# Patient Record
Sex: MALE | Race: WHITE | NOT HISPANIC OR LATINO | URBAN - METROPOLITAN AREA
[De-identification: names, ages, dates, MRNs, and addresses within clinical notes are randomized per-mention and may not be internally consistent; named-entity substitution may affect disease eponyms.]

---

## 2018-06-11 ENCOUNTER — TELEPHONE (OUTPATIENT)
Dept: CARDIOLOGY | Facility: CLINIC | Age: 73
End: 2018-06-11

## 2018-06-11 NOTE — TELEPHONE ENCOUNTER
Gastroenterologist asking for cardiac risk assessment and whether Xarelto can be stopped for colonoscopy scheduled for 6/19/2018.  Last seen in early January and in sinus rhythm on flecainide and Xarelto.  Xarelto may be held for 2 days prior to this procedure recognizing small risk of stroke.  He is at low cardiac risk in setting of colonoscopy.  Will confirm with Dr. Thapa.

## 2018-06-13 ENCOUNTER — TELEPHONE (OUTPATIENT)
Dept: SCHEDULING | Facility: CLINIC | Age: 73
End: 2018-06-13

## 2018-06-13 NOTE — TELEPHONE ENCOUNTER
[facility] note of 1/2/18 and ekg  faxed to [phone]

## 2018-06-15 ENCOUNTER — TELEPHONE (OUTPATIENT)
Dept: SCHEDULING | Facility: CLINIC | Age: 73
End: 2018-06-15

## 2018-06-15 NOTE — TELEPHONE ENCOUNTER
DANA the patient saw his primary physician earlier in the week and his physician is concerned that he has some cardiac issues going on.  The patient would not give details.  He was wondering if he should cancel his colonoscopy that is scheduled for Tuesday.  The colonoscopy is a routine exam and the patient is very anxious about his cardiac status.  He has an appoint with Dr. Thapa on Wednesday.  I offered to have him wait until Monday to discuss with JAZMYN Rey and Dr. Thapa but he reports that he has to stop start his prep tomorrow.  After a lengthy discussion he has decided he will postpone the colonoscopy and see Dr. Lamb on Wednesday.

## 2018-06-15 NOTE — TELEPHONE ENCOUNTER
Pt would like a call from Keely Jules to discuss 6/19 scheduled colonoscopy. Pt have a 6/20 appt scheduled with Dr. Thapa.

## 2018-06-20 ENCOUNTER — OFFICE VISIT (OUTPATIENT)
Dept: CARDIOLOGY | Facility: CLINIC | Age: 73
End: 2018-06-20
Payer: COMMERCIAL

## 2018-06-20 VITALS
DIASTOLIC BLOOD PRESSURE: 82 MMHG | WEIGHT: 221 LBS | HEIGHT: 74 IN | BODY MASS INDEX: 28.36 KG/M2 | RESPIRATION RATE: 16 BRPM | SYSTOLIC BLOOD PRESSURE: 124 MMHG | HEART RATE: 56 BPM

## 2018-06-20 DIAGNOSIS — R00.2 PALPITATIONS: ICD-10-CM

## 2018-06-20 DIAGNOSIS — E78.00 PURE HYPERCHOLESTEROLEMIA: ICD-10-CM

## 2018-06-20 DIAGNOSIS — I48.0 PAROXYSMAL ATRIAL FIBRILLATION (CMS/HCC): Primary | ICD-10-CM

## 2018-06-20 PROCEDURE — 99213 OFFICE O/P EST LOW 20 MIN: CPT | Performed by: INTERNAL MEDICINE

## 2018-06-20 PROCEDURE — 93000 ELECTROCARDIOGRAM COMPLETE: CPT | Performed by: INTERNAL MEDICINE

## 2018-06-20 RX ORDER — MULTIVITAMIN
1 TABLET ORAL DAILY
COMMUNITY
Start: 2011-07-20

## 2018-06-20 RX ORDER — ARMODAFINIL 150 MG/1
150 TABLET ORAL AS NEEDED
COMMUNITY
End: 2020-02-04 | Stop reason: ALTCHOICE

## 2018-06-20 RX ORDER — AZELASTINE 1 MG/ML
SPRAY, METERED NASAL AS NEEDED
COMMUNITY
Start: 2011-07-20 | End: 2020-02-04 | Stop reason: ALTCHOICE

## 2018-06-20 RX ORDER — FLECAINIDE ACETATE 50 MG/1
50 TABLET ORAL DAILY
COMMUNITY
Start: 2018-01-02 | End: 2018-06-21 | Stop reason: SDUPTHER

## 2018-06-20 RX ORDER — SIMVASTATIN 40 MG/1
40 TABLET, FILM COATED ORAL DAILY
COMMUNITY
Start: 2018-01-02 | End: 2018-06-21 | Stop reason: SDUPTHER

## 2018-06-20 RX ORDER — FLECAINIDE ACETATE 100 MG/1
100 TABLET ORAL DAILY
COMMUNITY
Start: 2018-01-02 | End: 2018-06-21 | Stop reason: SDUPTHER

## 2018-06-20 RX ORDER — FLUTICASONE PROPIONATE 50 UG/1
POWDER, METERED RESPIRATORY (INHALATION) AS NEEDED
COMMUNITY
End: 2020-02-04 | Stop reason: ALTCHOICE

## 2018-06-20 NOTE — PROGRESS NOTES
June 22, 2018      Patient: Roger Willig   YOB: 1945   Date of Visit 6/20/2018   [facility]# [id number]   MRN# [id number]       June 20, 2018    CROW ZELAYA  [address]  [place] [address]  [place] [address]    Dear CROW,    Roger Willig was in our cardiology outpatient offices today, a bit ahead of schedule.  He was concerned because he has been experiencing shortness of breath when he pushes a heavy object and is bent over.  He has been measuring his blood pressure and oxygen saturation at home and thought that there had been some changes.  He talked to you about this and you recommended that he see us in consultation and that is why he was here today.  I know that he had recent laboratory work that mostly looked fine, including his blood lipids.  As you will recall, he had an exercise echocardiogram about a year ago that did not reveal evidence of myocardial ischemia or important structural heart disease.  He has not had any sustained episodes of palpitations on   flecainide.  His other medicines are eyedrops, rivaroxaban, Zocor, and Nuvigil. Simvastatin seems to be doing an effective job of controlling his LDL cholesterol.  He is also due to have a colonoscopy, but held off on scheduling that procedure until our evaluation today.  On careful questioning, he did tell me that he occasionally has palpitations, especially when he lies in bed at night and rarely during activity.  He was quite concerned about that finding as well.    On examination today, blood pressure was 124/82, with a heart rate of 56 beats per minute.  His rhythm was regular, without ectopic beats.  Respiratory rate was 16.  He weighed about 221 pounds.  His lung fields were clear.  He did not have jugular venous distention or carotid bruits.  The cardiac examination revealed a regular rhythm with a fourth sound and no murmurs.  His abdomen was benign with no organomegaly or bruits.  He had no edema.  His electrocardiogram showed a  sinus rhythm with a borderline prolonged CT interval and a mild interventricular conduction delay due to flecainide.    I ordered a 48-hour ambulatory ECG for Mr. Willig to be doubly certain that he is not having sustained episodes of atrial fibrillation.  I do not think we need any other tests for the time-being, especially given the fact that he has had a recent stress echocardiogram.  We will review the results of Mr. Willig's Holter when they are available and if they are acceptable, I see no reason why he cannot go ahead with a colonoscopy, with the usual instructions about anticoagulation.  I also encouraged him to maintain his usual contact with you.    If you have any questions about Mr. Willig's cardiac situation at any time, I would be delighted to address them.  Mr. Willig had a number of questions today that we answered to his satisfaction and he left the office in good spirits.  Thank you for your consideration.    Sincerely,      YOMI AVILEZ MD    DD: 06/20/2018 12:02  DT: 06/20/2018 12:34  Voice ID: [id number]/Report ID: [id number]  [email or web address]

## 2018-06-21 ENCOUNTER — TELEPHONE (OUTPATIENT)
Dept: SCHEDULING | Facility: CLINIC | Age: 73
End: 2018-06-21

## 2018-06-21 RX ORDER — FLECAINIDE ACETATE 50 MG/1
50 TABLET ORAL DAILY
Qty: 30 TABLET | Refills: 6 | Status: SHIPPED | OUTPATIENT
Start: 2018-06-21 | End: 2018-12-20

## 2018-06-21 RX ORDER — SIMVASTATIN 40 MG/1
40 TABLET, FILM COATED ORAL DAILY
Qty: 30 TABLET | Refills: 6 | Status: SHIPPED | OUTPATIENT
Start: 2018-06-21 | End: 2018-12-20

## 2018-06-21 RX ORDER — FLECAINIDE ACETATE 100 MG/1
TABLET ORAL
Qty: 30 TABLET | Refills: 6 | Status: SHIPPED | OUTPATIENT
Start: 2018-06-21 | End: 2018-12-20

## 2018-06-21 NOTE — TELEPHONE ENCOUNTER
Dr Thapa pt call for wily re heart monitor being put on friday and Medication question. Please call pt cell to discuss

## 2018-06-21 NOTE — TELEPHONE ENCOUNTER
Reviewed process for Holter monitor and need to call our office 1 week after return to review results.  We will also refill all prescriptions at his request.

## 2018-06-29 ENCOUNTER — TELEPHONE (OUTPATIENT)
Dept: CARDIOLOGY | Facility: CLINIC | Age: 73
End: 2018-06-29

## 2018-12-20 ENCOUNTER — TELEPHONE (OUTPATIENT)
Dept: SCHEDULING | Facility: CLINIC | Age: 73
End: 2018-12-20

## 2018-12-20 RX ORDER — SIMVASTATIN 40 MG/1
40 TABLET, FILM COATED ORAL DAILY
Qty: 30 TABLET | Refills: 0 | COMMUNITY
Start: 2018-12-20 | End: 2019-02-05 | Stop reason: SDUPTHER

## 2018-12-20 RX ORDER — FLECAINIDE ACETATE 100 MG/1
TABLET ORAL
Qty: 30 TABLET | Refills: 0 | COMMUNITY
Start: 2018-12-20 | End: 2019-02-05 | Stop reason: SDUPTHER

## 2018-12-20 RX ORDER — FLECAINIDE ACETATE 50 MG/1
50 TABLET ORAL DAILY
Qty: 30 TABLET | Refills: 0 | COMMUNITY
Start: 2018-12-20 | End: 2019-02-05 | Stop reason: SDUPTHER

## 2018-12-20 NOTE — TELEPHONE ENCOUNTER
Email sent to Dr. Thapa.    See no contraindication to these medications but patient asked me to confirm directly with Dr. Thapa.

## 2018-12-20 NOTE — TELEPHONE ENCOUNTER
"Pt went to Urgent care for an \"issue\" and was prescribed Sulfamethoxazole Trime 800mg.  Pt would like to speak to Keely Jules and find out if this has any interaction with his Flecanide. Please call pt back @  [phone]    "

## 2018-12-20 NOTE — TELEPHONE ENCOUNTER
Confirmed with Dr. Thapa that no contraindication to this medication L on flecainide.  I left a message with the patient as well.

## 2019-02-05 ENCOUNTER — OFFICE VISIT (OUTPATIENT)
Dept: CARDIOLOGY | Facility: CLINIC | Age: 74
End: 2019-02-05
Payer: COMMERCIAL

## 2019-02-05 VITALS
HEART RATE: 56 BPM | BODY MASS INDEX: 29 KG/M2 | WEIGHT: 226 LBS | DIASTOLIC BLOOD PRESSURE: 70 MMHG | SYSTOLIC BLOOD PRESSURE: 116 MMHG | RESPIRATION RATE: 16 BRPM | HEIGHT: 74 IN | OXYGEN SATURATION: 98 %

## 2019-02-05 DIAGNOSIS — E78.00 PURE HYPERCHOLESTEROLEMIA: ICD-10-CM

## 2019-02-05 DIAGNOSIS — R00.2 PALPITATIONS: ICD-10-CM

## 2019-02-05 DIAGNOSIS — E78.2 MIXED HYPERLIPIDEMIA: ICD-10-CM

## 2019-02-05 DIAGNOSIS — I48.91 ATRIAL FIBRILLATION, UNSPECIFIED TYPE (CMS/HCC): Primary | ICD-10-CM

## 2019-02-05 PROCEDURE — 93000 ELECTROCARDIOGRAM COMPLETE: CPT | Performed by: INTERNAL MEDICINE

## 2019-02-05 PROCEDURE — 99213 OFFICE O/P EST LOW 20 MIN: CPT | Performed by: INTERNAL MEDICINE

## 2019-02-05 RX ORDER — SIMVASTATIN 40 MG/1
40 TABLET, FILM COATED ORAL DAILY
Qty: 30 TABLET | Refills: 0 | Status: SHIPPED | OUTPATIENT
Start: 2019-02-05 | End: 2019-03-20 | Stop reason: SDUPTHER

## 2019-02-05 RX ORDER — FLECAINIDE ACETATE 50 MG/1
50 TABLET ORAL DAILY
Qty: 30 TABLET | Refills: 0 | Status: SHIPPED | OUTPATIENT
Start: 2019-02-05 | End: 2019-07-29 | Stop reason: SDUPTHER

## 2019-02-05 RX ORDER — FLECAINIDE ACETATE 100 MG/1
TABLET ORAL
Qty: 30 TABLET | Refills: 0 | Status: SHIPPED | OUTPATIENT
Start: 2019-02-05 | End: 2019-03-29 | Stop reason: SDUPTHER

## 2019-02-05 ASSESSMENT — PAIN SCALES - GENERAL: PAINLEVEL: 0-NO PAIN

## 2019-02-05 NOTE — PROGRESS NOTES
February 5, 2019      Patient: Roger Willig   YOB: 1945   Date of Visit 2/5/2019   [facility]# [id number]   MRN# [id number]       February 05, 2019    CROW ZELAYA  [address]  [place] [address]  [place] [address]    Dear CROW,    Roger Willig was in our cardiology outpatient offices today for a follow-up appointment.  He has done well over the last several months.  He occasionally has palpitations, but they are short lasting and have not interfered with his ability to function.  We ordered a 48-hour ambulatory ECG monitor at his last visit.  I am happy to report that he did not have any sustained episodes of atrial fibrillation during that recording.  Heart rates were acceptable, and there were no other important findings.  We are up to date with regard to noninvasive cardiac studies, his last set having been completed in 2017.  He told me that he does see you on a regular basis and that you have been satisfied with his general medical assessments.  He has a right hand injury   that has required some attention.  He was concerned that anticoagulation might be exacerbating the problem. In any case, he may need referral, and I will leave that to your judgment.  His medicines include some EYE DROPS, FLECAINIDE, FLOVENT, RIVAROXABAN, and SIMVASTATIN.    On examination, blood pressure was 116/70 with a heart rate of 56 beats per minute.  His rhythm was regular without ectopic beats.  Respiratory rate was 16 and he weighed 226 pounds, the same as last time.  He looked to be in reasonable physical condition overall.  His lung fields were clear.  He did not have jugular venous distention or carotid bruits.  The cardiac examination was remarkable only for a fourth sound without any other extra sounds or murmurs or premature beats.  His abdomen was benign without organomegaly or bruits, and he did not have any edema of the lower extremities.  Electrocardiogram showed sinus rhythm with an APD and no other  findings.  Electrocardiographic intervals were acceptable.    Mr. Willig is stable, and so I did not change his medicines or order any tests. He will be back in 6 months for a surveillance visit, and we will be available to see him sooner and to address issues in the interim.  I am delighted that Mr. Willig has had a good response to a conservative program.  Thank you for sharing his care.    Best regards,        YOMI AVILEZ MD        DD: 02/05/2019 10:20  DT: 02/05/2019 11:47  Voice ID: [id number]/Report ID: [id number]  [email or web address]

## 2019-03-20 RX ORDER — SIMVASTATIN 40 MG/1
40 TABLET, FILM COATED ORAL DAILY
Qty: 30 TABLET | Refills: 6 | Status: SHIPPED | OUTPATIENT
Start: 2019-03-20 | End: 2019-08-06 | Stop reason: SDUPTHER

## 2019-03-29 RX ORDER — FLECAINIDE ACETATE 100 MG/1
TABLET ORAL
Qty: 90 TABLET | Refills: 1 | Status: SHIPPED | OUTPATIENT
Start: 2019-03-29 | End: 2019-07-26 | Stop reason: SDUPTHER

## 2019-07-26 RX ORDER — FLECAINIDE ACETATE 100 MG/1
TABLET ORAL
Qty: 90 TABLET | Refills: 1 | Status: SHIPPED | OUTPATIENT
Start: 2019-07-26 | End: 2019-07-29 | Stop reason: SDUPTHER

## 2019-07-26 NOTE — TELEPHONE ENCOUNTER
Dr. Thapa- pharmacy calling to verify dosing on flecainide. Per you OV from 2/5/19 flecainide is listed as 100 mg daily in am and 50 mg oral daily. I did escribe to pharm for flecainide 100 mg daily.  Please clarify.

## 2019-07-29 ENCOUNTER — TELEPHONE (OUTPATIENT)
Dept: SCHEDULING | Facility: CLINIC | Age: 74
End: 2019-07-29

## 2019-07-29 RX ORDER — FLECAINIDE ACETATE 100 MG/1
TABLET ORAL
Qty: 30 TABLET | Refills: 1 | Status: SHIPPED | OUTPATIENT
Start: 2019-07-29 | End: 2019-08-06 | Stop reason: SDUPTHER

## 2019-07-29 RX ORDER — FLECAINIDE ACETATE 50 MG/1
50 TABLET ORAL DAILY
Qty: 90 TABLET | Refills: 1 | Status: SHIPPED | OUTPATIENT
Start: 2019-07-29 | End: 2019-07-29 | Stop reason: SDUPTHER

## 2019-07-29 RX ORDER — FLECAINIDE ACETATE 100 MG/1
TABLET ORAL
Qty: 90 TABLET | Refills: 1 | Status: SHIPPED | OUTPATIENT
Start: 2019-07-29 | End: 2019-07-29 | Stop reason: SDUPTHER

## 2019-07-29 RX ORDER — FLECAINIDE ACETATE 50 MG/1
TABLET ORAL
Qty: 30 TABLET | Refills: 1 | Status: SHIPPED | OUTPATIENT
Start: 2019-07-29 | End: 2019-08-06

## 2019-07-29 NOTE — TELEPHONE ENCOUNTER
Please call the patient and find out how he is currently taking the medication. Once a day is generally not adequate. He needs to take a second dose and that can be either 100 or 50 mg.

## 2019-07-29 NOTE — TELEPHONE ENCOUNTER
Patient returning call from Ame Griffin RN regarding his Flecainide dosing. He is currently taking 50mg daily in the morning and 100 mg daily in the evening. He has a sufficient supply of this medication. He has two different strength tablets at home, both a 50mg and 100mg.  I have updated his medication list.      Just an KERRYI

## 2019-07-29 NOTE — TELEPHONE ENCOUNTER
Elier pt - called pt back LMOM need to know exact dosing of flecanide he is taking.  Needs to be taken twice daily 100mg in AM and at least 50mg or 100mg in PM

## 2019-07-29 NOTE — TELEPHONE ENCOUNTER
Pt returned call to practice regarding Flecainide refill request. Please call back asap at [phone].

## 2019-08-06 ENCOUNTER — OFFICE VISIT (OUTPATIENT)
Dept: CARDIOLOGY | Facility: CLINIC | Age: 74
End: 2019-08-06
Payer: COMMERCIAL

## 2019-08-06 VITALS
BODY MASS INDEX: 28.62 KG/M2 | WEIGHT: 223 LBS | HEART RATE: 78 BPM | SYSTOLIC BLOOD PRESSURE: 124 MMHG | DIASTOLIC BLOOD PRESSURE: 82 MMHG | HEIGHT: 74 IN | OXYGEN SATURATION: 97 %

## 2019-08-06 DIAGNOSIS — I48.0 PAROXYSMAL ATRIAL FIBRILLATION (CMS/HCC): ICD-10-CM

## 2019-08-06 DIAGNOSIS — E78.00 PURE HYPERCHOLESTEROLEMIA: ICD-10-CM

## 2019-08-06 DIAGNOSIS — R00.2 PALPITATIONS: ICD-10-CM

## 2019-08-06 DIAGNOSIS — I48.0 PAF (PAROXYSMAL ATRIAL FIBRILLATION) (CMS/HCC): Primary | ICD-10-CM

## 2019-08-06 PROCEDURE — 99213 OFFICE O/P EST LOW 20 MIN: CPT | Performed by: INTERNAL MEDICINE

## 2019-08-06 PROCEDURE — 93000 ELECTROCARDIOGRAM COMPLETE: CPT | Performed by: INTERNAL MEDICINE

## 2019-08-06 RX ORDER — FLECAINIDE ACETATE 100 MG/1
TABLET ORAL
Qty: 30 TABLET | Refills: 6 | Status: SHIPPED | OUTPATIENT
Start: 2019-08-06 | End: 2020-02-04 | Stop reason: SDUPTHER

## 2019-08-06 RX ORDER — SIMVASTATIN 40 MG/1
40 TABLET, FILM COATED ORAL DAILY
Qty: 30 TABLET | Refills: 6 | Status: SHIPPED | OUTPATIENT
Start: 2019-08-06 | End: 2020-02-04 | Stop reason: ALTCHOICE

## 2019-08-06 NOTE — PROGRESS NOTES
August 6, 2019      Patient: Roger Willig   YOB: 1945   Date of Visit 8/6/2019   [facility]# [id number]   MRN# [id number]       August 06, 2019    Dr. CROW ZELAYA  [address]  [place] [address]  [place] [address]    Dear CROW,    Roger Willig was in our cardiology outpatient offices today for a routine appointment.  Generally, he has done well.  As you know, he reduced his dose of flecainide to 100 mg in the morning and 50 mg in the evening and this may have been associated with an increased number of atrial fibrillation episodes.  We discussed today the idea of going back to 100 mg twice per day.  This may have caused a bit of dizziness in the past and will see if he has similar adverse effects.  Otherwise, he is tolerating his medical program well and it consists of simvastatin, rivaroxaban, flecainide, an occasional inhaler and eyedrops.  We are up to date currently with noninvasive cardiac studies. Mr. Willig has had no other cardiovascular issues and was in good spirits.  He told me that he does see you on a regular basis for routine laboratory testing and that you have been satisfied with those results including his lipid analyses.  He had a number of questions today about exercise and weight control and I believe he understands the value of both.    On examination today, blood pressure was 124/82 with a heart rate of 78 beats per minute.  His rhythm was regular with an occasional ectopic beat. Respiratory rate was 16 and he weighed 223 pounds, about 3 pounds less than last time.  His lung fields were clear.  He did not have jugular venous distention and I did not hear any carotid bruits.  He had his usual fourth sound on cardiac auscultation without any other extra sounds or murmurs.  His abdomen was benign with no organomegaly or bruits.  He did not have any edema.  Electrocardiogram showed sinus rhythm with a prolonged OK interval of 200 milliseconds and occasionally APDs.  His QRS duration  was not unduly extended on flecainide.    Mr. Willig then is stable from our perspective and so I did not alter his medical program.  I did provide him with refills for his cardiac prescriptions. He will return in 6 months.  After that visit, we will schedule an exercise echocardiogram for surveillance.  If in the meantime, should you require our assistance or have any other questions about Mr. Willig's cardiovascular situation, please let me know. Thank you for your consideration.    Sincerely,        YOMI AVILEZ MD    DD: 08/06/2019 09:02  DT: 08/06/2019 09:41  Voice ID: [id number]/Report ID: [id number]  [email or web address]

## 2019-10-17 ENCOUNTER — TELEPHONE (OUTPATIENT)
Dept: SCHEDULING | Facility: CLINIC | Age: 74
End: 2019-10-17

## 2019-10-17 NOTE — TELEPHONE ENCOUNTER
DR AVILEZ pt--reports he is having some hamstring pain and wishes to know of he should change SIMVASTATIN 40 MG to C Q10 after talking to a friend and a pharmacist.     He states he is exercising, adding exercises and this started three weeks ago after a 5 mile walk.  He has tried other therapies to help the pain with no relief.    HE reports he has been taking SIMVASTATIN for years.    Per protocol advised PT to stop the SIMVASTATIN for two weeks, and then call our office with report on pain changes.    He is going to [place] for a trip, he states he will call in two weeks with update.    Please advise of any changes.    PT can be reace if needed at [phone].

## 2019-11-11 ENCOUNTER — TELEPHONE (OUTPATIENT)
Dept: SCHEDULING | Facility: CLINIC | Age: 74
End: 2019-11-11

## 2019-11-11 NOTE — TELEPHONE ENCOUNTER
Spoke to patient.  He had lab work done May 2019 through his PCP.  He has a follow-up appointment with his PCP in December 2019.  I advised him to discuss with his PCP unless you would like to change his statin-  Dr. Thapa.

## 2019-11-11 NOTE — TELEPHONE ENCOUNTER
I called patient, not available.,  Left message to call back.    I do not see any labs/lipid profile in [facility] system.    Which Drug would you like to recommend Dr. Thapa?  Patient should follow-up with his PCP

## 2019-11-11 NOTE — TELEPHONE ENCOUNTER
Pt of Dr. Thapa- Pt calling with update of statin hold. Pt currently holding simvastatin x2 weeks due to knee pain. Pt calling to report he felt no different after 5 days and went back on simvastatin. However he noticed that while he was holding his shoulder, low back, and right knee pain improved.     Pt would like to try a different lipid lowering medication.     Asking for new script to be sent for 30 w/ 2 refills to [facility]      Pt can be reached at [phone]

## 2019-11-12 NOTE — TELEPHONE ENCOUNTER
Patient requesting call back in regards to previous messages.    Pt states he did not speak with his PCP, requests information from Dr. Thapa on what to do.    Pt can be reached at [phone]

## 2019-11-29 NOTE — TELEPHONE ENCOUNTER
Finally got hold of the patient.  He said he followed up with his primary and he was started on pravastatin 20 mg daily with much improvement in his joint pains.  Advised to continue pravastatin as ordered by his PCP.

## 2020-02-04 ENCOUNTER — OFFICE VISIT (OUTPATIENT)
Dept: CARDIOLOGY | Facility: CLINIC | Age: 75
End: 2020-02-04
Payer: COMMERCIAL

## 2020-02-04 VITALS
BODY MASS INDEX: 29 KG/M2 | DIASTOLIC BLOOD PRESSURE: 78 MMHG | HEIGHT: 74 IN | SYSTOLIC BLOOD PRESSURE: 118 MMHG | HEART RATE: 59 BPM | WEIGHT: 226 LBS | OXYGEN SATURATION: 98 %

## 2020-02-04 DIAGNOSIS — E78.2 MIXED HYPERLIPIDEMIA: ICD-10-CM

## 2020-02-04 DIAGNOSIS — R00.2 PALPITATIONS: ICD-10-CM

## 2020-02-04 DIAGNOSIS — I48.0 PAROXYSMAL ATRIAL FIBRILLATION (CMS/HCC): ICD-10-CM

## 2020-02-04 DIAGNOSIS — E78.00 PURE HYPERCHOLESTEROLEMIA: ICD-10-CM

## 2020-02-04 DIAGNOSIS — I48.0 PAF (PAROXYSMAL ATRIAL FIBRILLATION) (CMS/HCC): Primary | ICD-10-CM

## 2020-02-04 PROCEDURE — 99213 OFFICE O/P EST LOW 20 MIN: CPT | Performed by: INTERNAL MEDICINE

## 2020-02-04 PROCEDURE — 93000 ELECTROCARDIOGRAM COMPLETE: CPT | Performed by: INTERNAL MEDICINE

## 2020-02-04 RX ORDER — PRAVASTATIN SODIUM 20 MG/1
20 TABLET ORAL DAILY
COMMUNITY
End: 2020-02-04 | Stop reason: SDUPTHER

## 2020-02-04 RX ORDER — FLECAINIDE ACETATE 100 MG/1
100 TABLET ORAL 2 TIMES DAILY
Qty: 180 TABLET | Refills: 3 | Status: SHIPPED | OUTPATIENT
Start: 2020-02-04 | End: 2021-02-02 | Stop reason: DRUGHIGH

## 2020-02-04 RX ORDER — PRAVASTATIN SODIUM 20 MG/1
20 TABLET ORAL DAILY
Qty: 90 TABLET | Refills: 3 | Status: SHIPPED | OUTPATIENT
Start: 2020-02-04 | End: 2021-02-02 | Stop reason: DRUGHIGH

## 2020-02-04 NOTE — PROGRESS NOTES
February 4, 2020      Patient: Roger Willig   YOB: 1945   Date of Visit 2/4/2020   [facility]# [id number]   MRN# [id number]       February 04, 2020    DR. CROW ZELAYA  [address]  [place] [address]  [place] [address]    Dear DR. ZELAYA:    Roger Willig was in our offices today for a scheduled appointment.  He continues to do well from the cardiovascular perspective.  He had a number of questions today about his medications.  He has been switched over from SIMVASTATIN to PRAVASTATIN because of myalgias and he seems to be tolerating 20 mg a day well.  His LDL cholesterol has remained in the excess of 90 mg/dL and so he asked about increasing the dose to 40 mg per day, which he will do on a trial basis.  He will be speaking with you about this and obtaining a repeat set of lipids.  He is also using RIVAROXABAN, FLECAINIDE 100 mg twice per day, and CALCIUM.  He has been able to maintain a high level of activity and had no new cardiovascular symptoms.  He is due for an exercise echocardiogram for surveillance on FLECAINIDE, and that test will be carried out in the next 6 months.  He has been maintaining good contact with you and his other physicians.  He knows he needs to increase his exercise routine and lose a few pounds.  He has been limited by small hernias, but understands the importance of regular activity.  There were no other cardiac issues.    On examination, blood pressure was 118/78 with a heart rate of 60 beats per minute.  His rhythm was regular without ectopic beats.  Respiratory rate was 18 and he weighed 226 pounds, 3 pounds more than last time.  His lung fields were clear bilaterally to percussion and to auscultation.  He had no carotid bruits and his jugular venous pulse was not increased.  His cardiac examination revealed normal heart tones and his usual fourth sound without any other extra sounds or murmurs.  He had no abdominal findings of organomegaly and he did not have any bruits.   His peripheral pulses were palpable bilaterally.  His electrocardiogram showed sinus rhythm with a prolonged OR interval of 216 milliseconds and a mild interventricular conduction delay due to   FLECAINIDE.    Mr. Willig is stable from our perspective and so I did not change his medicines, but did give him refills for his cardiac medication prescriptions.  We will review the results of his exercise echocardiogram when they are available and make sure that you receive a copy of the report.  If all is well, Mr. Willig's next visit with me will be in the summer.  If in the meantime he requires assistance or have any questions, please let me know. Thank you for sharing Mr. Willig's care.    Sincerely,        YOMI AVILEZ MD    DD: 02/04/2020 09:44  DT: 02/04/2020 10:39  Voice ID: [id number]/Report ID: [id number]  [email or web address]

## 2020-03-11 ENCOUNTER — APPOINTMENT (RX ONLY)
Dept: URBAN - METROPOLITAN AREA CLINIC 374 | Facility: CLINIC | Age: 75
Setting detail: DERMATOLOGY
End: 2020-03-11

## 2020-03-11 DIAGNOSIS — L663 OTHER SPECIFIED DISEASES OF HAIR AND HAIR FOLLICLES: ICD-10-CM

## 2020-03-11 DIAGNOSIS — L72.8 OTHER FOLLICULAR CYSTS OF THE SKIN AND SUBCUTANEOUS TISSUE: ICD-10-CM

## 2020-03-11 DIAGNOSIS — L72.0 EPIDERMAL CYST: ICD-10-CM

## 2020-03-11 DIAGNOSIS — L57.0 ACTINIC KERATOSIS: ICD-10-CM

## 2020-03-11 DIAGNOSIS — L73.9 FOLLICULAR DISORDER, UNSPECIFIED: ICD-10-CM

## 2020-03-11 DIAGNOSIS — L738 OTHER SPECIFIED DISEASES OF HAIR AND HAIR FOLLICLES: ICD-10-CM

## 2020-03-11 PROBLEM — L02.221 FURUNCLE OF ABDOMINAL WALL: Status: ACTIVE | Noted: 2020-03-11

## 2020-03-11 PROBLEM — L02.224 FURUNCLE OF GROIN: Status: ACTIVE | Noted: 2020-03-11

## 2020-03-11 PROCEDURE — 99202 OFFICE O/P NEW SF 15 MIN: CPT | Mod: 25

## 2020-03-11 PROCEDURE — ? PRESCRIPTION

## 2020-03-11 PROCEDURE — ? PRESCRIPTION MEDICATION MANAGEMENT

## 2020-03-11 PROCEDURE — ? COUNSELING

## 2020-03-11 PROCEDURE — ? MEDICATION COUNSELING

## 2020-03-11 PROCEDURE — 17000 DESTRUCT PREMALG LESION: CPT | Mod: 59

## 2020-03-11 PROCEDURE — 17110 DESTRUCTION B9 LES UP TO 14: CPT

## 2020-03-11 PROCEDURE — ? LIQUID NITROGEN

## 2020-03-11 RX ORDER — HYDROCORTISONE AND IODOCHLORHYDROXYQUIN 5; 30 MG/G; MG/G
CREAM TOPICAL BID
Qty: 1 | Refills: 2 | Status: ERX | COMMUNITY
Start: 2020-03-11

## 2020-03-11 RX ADMIN — HYDROCORTISONE AND IODOCHLORHYDROXYQUIN 3-0.5: 5; 30 CREAM TOPICAL at 00:00

## 2020-03-11 ASSESSMENT — LOCATION SIMPLE DESCRIPTION DERM
LOCATION SIMPLE: GROIN
LOCATION SIMPLE: LEFT CHEEK
LOCATION SIMPLE: NOSE
LOCATION SIMPLE: SCROTUM

## 2020-03-11 ASSESSMENT — LOCATION DETAILED DESCRIPTION DERM
LOCATION DETAILED: NASAL DORSUM
LOCATION DETAILED: LEFT SCROTUM
LOCATION DETAILED: LEFT SUPRAPUBIC SKIN
LOCATION DETAILED: RIGHT SCROTUM
LOCATION DETAILED: LEFT SUPERIOR MEDIAL MALAR CHEEK
LOCATION DETAILED: RIGHT SUPRAPUBIC SKIN

## 2020-03-11 ASSESSMENT — LOCATION ZONE DERM
LOCATION ZONE: TRUNK
LOCATION ZONE: GENITALIA
LOCATION ZONE: FACE
LOCATION ZONE: NOSE

## 2020-03-11 NOTE — PROCEDURE: PRESCRIPTION MEDICATION MANAGEMENT
Render In Strict Bullet Format?: No
Initiate Treatment: Cephalexin 500mg tablets bid x 2 weeks \\nHydrogen peroxide qday to the cyst of the groin area
Detail Level: Zone
Initiate Treatment: Cephalexin 500mg tablets bid \\Aisha-ignacio cream bid to the rashes of the groin area

## 2020-07-02 ENCOUNTER — TELEPHONE (OUTPATIENT)
Dept: CARDIOLOGY | Facility: CLINIC | Age: 75
End: 2020-07-02

## 2020-07-13 ENCOUNTER — HOSPITAL ENCOUNTER (OUTPATIENT)
Dept: CARDIOLOGY | Facility: CLINIC | Age: 75
Discharge: HOME | End: 2020-07-13
Payer: COMMERCIAL

## 2020-07-13 VITALS — HEIGHT: 74 IN | BODY MASS INDEX: 29 KG/M2 | WEIGHT: 226 LBS

## 2020-07-13 DIAGNOSIS — I48.0 PAF (PAROXYSMAL ATRIAL FIBRILLATION) (CMS/HCC): ICD-10-CM

## 2020-07-13 LAB
ASCENDING AORTA: 4.3 CM
BSA FOR ECHO PROCEDURE: 2.29 M2
E WAVE DECELERATION TIME: 331 MS
E/A RATIO: 0.7
E/E' RATIO: 8
E/LAT E' RATIO: 6.2
EDV (BP): 67.5 CM3
EF (A4C): 69.8 %
EF A2C: 65.4 %
EJECTION FRACTION: 65.2 %
ESV (BP): 23.5 CM3
LA ESV (BP): 45.9 CM3
LA ESV INDEX (A2C): 22.18 CM3/M2
LA ESV INDEX (BP): 20.04 CM3/M2
LAAS-AP2: 18.2 CM2
LAAS-AP4: 16.6 CM2
LALD A4C: 5.59 CM
LALD A4C: 5.75 CM
LAV-S: 50.8 CM3
LEFT ATRIUM VOLUME INDEX: 17.64 CM3/M2
LEFT ATRIUM VOLUME: 40.4 CM3
LEFT VENTRICLE DIASTOLIC VOLUME INDEX: 30.96 CM3/M2
LEFT VENTRICLE DIASTOLIC VOLUME: 70.9 CM3
LEFT VENTRICLE SYSTOLIC VOLUME INDEX: 9.34 CM3/M2
LEFT VENTRICLE SYSTOLIC VOLUME: 21.4 CM3
LV DIASTOLIC VOLUME: 63.9 CM3
LV ESV (APICAL 2 CHAMBER): 22 CM3
LVAD-AP2: 24.1 CM2
LVAD-AP4: 25.5 CM2
LVAS-AP2: 12.4 CM2
LVAS-AP4: 11.4 CM2
LVEDVI(A2C): 27.9 CM3/M2
LVEDVI(BP): 29.48 CM3/M2
LVESVI(A2C): 9.61 CM3/M2
LVESVI(BP): 10.26 CM3/M2
LVLD-AP2: 7.63 CM
LVLD-AP4: 7.69 CM
LVLS-AP2: 6.11 CM
LVLS-AP4: 5.16 CM
MV E'TISSUE VEL-LAT: 0.1 M/S
MV E'TISSUE VEL-MED: 0.08 M/S
MV PEAK A VEL: 0.84 M/S
MV PEAK E VEL: 0.62 M/S
RVOT VMAX: 0.76 M/S
STRESS ANGINA INDEX: 0
STRESS BASELINE BP: NORMAL MMHG
STRESS BASELINE HR: 85 BPM
STRESS O2 SAT REST: 97 %
STRESS PERCENT HR: 86 %
STRESS POST ESTIMATED WORKLOAD: 7 METS
STRESS POST EXERCISE DUR MIN: 4 MIN
STRESS POST EXERCISE DUR SEC: 41 SEC
STRESS POST PEAK BP: NORMAL MMHG
STRESS POST PEAK HR: 123 BPM
STRESS TARGET HR: 122 BPM
TR MAX PG: 21 MMHG
TRICUSPID VALVE PEAK REGURGITATION VELOCITY: 2.31 M/S

## 2020-07-13 PROCEDURE — 93351 STRESS TTE COMPLETE: CPT | Performed by: INTERNAL MEDICINE

## 2020-07-21 ENCOUNTER — TELEPHONE (OUTPATIENT)
Dept: CARDIOLOGY | Facility: CLINIC | Age: 75
End: 2020-07-21

## 2020-07-21 NOTE — TELEPHONE ENCOUNTER
Left message for patients consent to switch 8/4 appointment with Dr. Thapa to a Telemed appointment. Patient was made aware in message that Dr. Thapa is not seeing patients in the office until the Fall.

## 2020-08-04 ENCOUNTER — TELEMEDICINE (OUTPATIENT)
Dept: CARDIOLOGY | Facility: CLINIC | Age: 75
End: 2020-08-04
Payer: COMMERCIAL

## 2020-08-04 VITALS
SYSTOLIC BLOOD PRESSURE: 117 MMHG | HEART RATE: 57 BPM | DIASTOLIC BLOOD PRESSURE: 76 MMHG | BODY MASS INDEX: 28.5 KG/M2 | WEIGHT: 222 LBS | OXYGEN SATURATION: 97 %

## 2020-08-04 DIAGNOSIS — E78.00 PURE HYPERCHOLESTEROLEMIA: ICD-10-CM

## 2020-08-04 DIAGNOSIS — I48.0 PAROXYSMAL ATRIAL FIBRILLATION (CMS/HCC): Primary | ICD-10-CM

## 2020-08-04 DIAGNOSIS — R00.2 PALPITATIONS: ICD-10-CM

## 2020-08-04 PROCEDURE — 99212 OFFICE O/P EST SF 10 MIN: CPT | Mod: 95 | Performed by: INTERNAL MEDICINE

## 2020-08-04 NOTE — PATIENT INSTRUCTIONS
Continue current medications and see my in about 6 months in [place]. Call if questions or concerns.

## 2020-08-04 NOTE — PROGRESS NOTES
August 9, 2020      Patient: Roger Willig   YOB: 1945   Date of Visit 8/4/2020   [facility]# [id number]   MRN# [id number]       August 04, 2020    TELEMEDICINE VISIT    Roger Willig attended a telemedicine visit with me today.  He is aware of the fact that we are conducting these visits to preserve patient safety and to answer questions.  They are necessarily limited by not including a physical examination or an electrocardiogram.  Nonetheless, given their value, Mr. Willig was anxious to proceed.  Our call lasted about 12 minutes.  He understands he may be responsible for a bill or for a copay.    Mr. Willig has been doing very well.  He has had no symptoms to suggest recurrent arrhythmia, ischemia, or heart failure.  As ordered, he had an exercise echocardiogram in July.  I am happy to report that the test was normal in all respects.  He had no evidence of stress-induced myocardial ischemia or arrhythmia and he had normal wall motion throughout.  There were also no provoked arrhythmias.  His medicines have remained the same and include PRAVASTATIN now at a dose of 40 mg per day, RIVAROXABAN, and FLECAINIDE.  Mr. Willig had no other questions or any other cardiovascular issues and was in good spirits.  He told me that he has been exercising on a regular basis and has kept his weight under control.  He has been bicycling and walking and enjoying the activity.    We decided that Mr. Willig will continue on his current program and I will see him in the [place] meeting office in about 4 to 6 months.  In the meantime, he will continue contact with Dr. Gonzales and his other physicians for routine laboratory studies and other issues.  He knows to call should there be any important change in his clinical situation or should he have any questions or concerns.      Mr. Willig left the visit today feeling secure and in good spirits, and I am sure he will follow through as instructed.        YOMI AVILEZ,  MD        DD: 08/04/2020 09:34  DT: 08/04/2020 12:32  Voice ID: [id number]/Report ID: [id number]  [email or web address]

## 2021-01-21 ENCOUNTER — TELEPHONE (OUTPATIENT)
Dept: SCHEDULING | Facility: CLINIC | Age: 76
End: 2021-01-21

## 2021-01-21 RX ORDER — FLECAINIDE ACETATE 50 MG/1
50 TABLET ORAL EVERY EVENING
Qty: 30 TABLET | Refills: 0 | Status: SHIPPED | OUTPATIENT
Start: 2021-01-21 | End: 2021-02-02 | Stop reason: DRUGHIGH

## 2021-01-21 NOTE — TELEPHONE ENCOUNTER
I spoke to the patient and ordered 50 mg tablets as per patient's request as he recently filled 100 mg tablets.  Advised to take flecainide 100 mg in a.m. and 150 mg in p.m.  He will follow-up with Dr. Thapa on 2/2/2021.

## 2021-01-21 NOTE — TELEPHONE ENCOUNTER
Pt calling to speak with RN Triage Team regarding a medication.     Pt can be reached @ [phone].     TY

## 2021-01-21 NOTE — TELEPHONE ENCOUNTER
He can try to increase the flecainide to 100 mg in the morning and 150 mg in the afternoon and let us know if that helps after a few days. He may need a new script.

## 2021-01-21 NOTE — TELEPHONE ENCOUNTER
Pt of Dr. Thapa- hx of afib on xarelto and flecainide 100 mg BID.     Calling to report for the last week or two has woken up in afib. Resolves half hour after taking morning flecainide. Reports he can feel arrhythmia and sometimes has minor dizziness. HR remains in the 50s. BP unchanged around 110-120/60-70.     Pt denies new medication changes. Has been trying to cut back on calories. Feels he is well hydrated. No caffeine.     Does note that pulse ox more consistently reading in low 90's when in afib, denies SOB.     Pt would like to discuss increasing flecainide.     He can be reached at  [phone]

## 2021-02-02 ENCOUNTER — TELEMEDICINE (OUTPATIENT)
Dept: CARDIOLOGY | Facility: CLINIC | Age: 76
End: 2021-02-02
Payer: COMMERCIAL

## 2021-02-02 DIAGNOSIS — I48.0 PAROXYSMAL ATRIAL FIBRILLATION (CMS/HCC): Primary | ICD-10-CM

## 2021-02-02 PROCEDURE — 99212 OFFICE O/P EST SF 10 MIN: CPT | Mod: 95 | Performed by: INTERNAL MEDICINE

## 2021-02-02 RX ORDER — FLECAINIDE ACETATE 100 MG/1
150 TABLET ORAL 2 TIMES DAILY
Qty: 90 TABLET | Refills: 6 | Status: SHIPPED | OUTPATIENT
Start: 2021-02-02 | End: 2024-09-24

## 2021-02-02 RX ORDER — PRAVASTATIN SODIUM 40 MG/1
40 TABLET ORAL DAILY
Qty: 30 TABLET | Refills: 6 | Status: SHIPPED | OUTPATIENT
Start: 2021-02-02 | End: 2024-09-24

## 2021-02-02 NOTE — PATIENT INSTRUCTIONS
Take one and half of the 100 mg flecainide twice a day and make an appointment to see me in about 4 months. Call if you have more arrhythmia and want to discuss alternative treatments.

## 2021-02-03 NOTE — PROGRESS NOTES
February 4, 2021      Patient: Roger Willig   YOB: 1945   Date of Visit 2/2/2021   [facility]# [id number]   MRN# [id number]       REPORT TYPE: Telemedicine Note    DATE OF SERVICE: 02/02/2021    Roger Willig attended a telemedicine visit with me today that lasted about 15 minutes--telephone only.  During that visit we discussed his current clinical situation including recurrences of atrial fibrillation, reviewed his medications, made some recommendations about treatment, discussed follow-up care and provided documentation.  Mr. Willig is aware of the fact that we are conducting these visits to preserve patient safety and to answer questions.  They are necessarily limited by not including a physical examination or an electrocardiogram.  Nonetheless, given their value, he was anxious to proceed.  He understands that he may be responsible for a bill or for a copay.    Mr. Willig has been having more frequent episodes of atrial fibrillation.  We have told him to gently increase his FLECAINIDE dose to 100 mg in the morning and 150 mg in the evening and that may have caused some improvement, but not an adequate amount and so I increased the dose again to 150 mg twice per day.  His other medicines are PRAVASTATIN, RIVAROXABAN, and VITAMINS.  He has been able to pursue all of his normal activities.  He is not sure how much atrial fibrillation he is actually having because he can go periods in which he only feels a single premature beat.  He has had no other cardiovascular symptoms.  His last noninvasive cardiac test consisted of a stress echocardiogram in June 2020 that was an unremarkable study.  He has maintained his follow-up with Dr. Gonzales.    We decided that we will continue to monitor Mr. Willig on his increased dose of FLECAINIDE and his other medications.  I provided refills for his cardiac drugs.  If his arrhythmias continue to be a problem, he will make an appointment and we will discuss alternatives  that will include either another antiarrhythmic drug or a cardiac ablation procedure.  If all is well, however, his next scheduled appointment with me will be in approximately 4 months.  However, I strongly encouraged him to call should he have any other issues or questions, and I am sure he will be compliant.      Mr. Willig was satisfied with the results of his telemedicine call today and I am sure he will follow through as indicated.        Ferdinand Thapa MD    DD: 02/02/2021 14:03  DT: 02/02/2021 14:05  Voice ID: [id number]/Report ID: [id number]  [email or web address] Laird

## 2021-03-08 ENCOUNTER — TELEPHONE (OUTPATIENT)
Dept: SCHEDULING | Facility: CLINIC | Age: 76
End: 2021-03-08

## 2021-03-08 NOTE — TELEPHONE ENCOUNTER
Cardiac Clearance     Name of caller: Roger Willig     Relationship to patient: self    Name of patient: Roger Willig    Name of physician: Ferdinand Thapa MD    Date of Surgery: 04/01/2021    Type of Surgery: hernia repair    Name of surgeon: Patricio Moore MD    Office contact number: #[phone]    Office fax number: #[phone]    Addendums  Is patient able to be cleared based on last telemed appt on 02/02/2021?  If unable to clear patient, please contact patient at #[phone].     Additional notes:   Pt is inquiring if/when he should stop Xarelto prior to surgery?       Pt is traveling to [place], [place] on 03/16 and will be returning on 03/25.

## 2021-03-08 NOTE — TELEPHONE ENCOUNTER
Patient has history of paroxysmal atrial fibrillation on Xarelto and flecainide. Is it okay to hold Xarelto for 2 days and the  routine AC instructions?

## 2021-03-08 NOTE — TELEPHONE ENCOUNTER
Should be low risk for hernia repair.  He has to ask the surgeon about stopping Xarelto pre-op. If the surgeon wants it stopped, 2 days should be adequate with the usual warnings about restarting as soon as possible post-op and stroke risk while off of it.

## 2021-03-08 NOTE — TELEPHONE ENCOUNTER
Patient was last seen on 2/2/2021 flecainide increased to 150 twice daily.      EKG done on 2/4/2021 shows sinus bradycardia with heart rate 51, LIDIA 216 ms and  ms.    Will bring him for  Pre-op EKG       Pl  call patient and schedule for an EKG prior to his procedure on 4/1/2021

## 2021-03-09 ENCOUNTER — OFFICE VISIT (OUTPATIENT)
Dept: CARDIOLOGY | Facility: CLINIC | Age: 76
End: 2021-03-09
Payer: COMMERCIAL

## 2021-03-09 VITALS
DIASTOLIC BLOOD PRESSURE: 86 MMHG | HEART RATE: 65 BPM | SYSTOLIC BLOOD PRESSURE: 134 MMHG | OXYGEN SATURATION: 98 % | BODY MASS INDEX: 28.36 KG/M2 | HEIGHT: 74 IN | WEIGHT: 221 LBS

## 2021-03-09 DIAGNOSIS — I48.0 PAROXYSMAL ATRIAL FIBRILLATION (CMS/HCC): Primary | ICD-10-CM

## 2021-03-09 DIAGNOSIS — E78.2 MIXED HYPERLIPIDEMIA: ICD-10-CM

## 2021-03-09 DIAGNOSIS — R00.2 PALPITATIONS: ICD-10-CM

## 2021-03-09 DIAGNOSIS — E78.00 PURE HYPERCHOLESTEROLEMIA: ICD-10-CM

## 2021-03-09 PROCEDURE — 93000 ELECTROCARDIOGRAM COMPLETE: CPT | Performed by: INTERNAL MEDICINE

## 2021-03-09 PROCEDURE — 99212 OFFICE O/P EST SF 10 MIN: CPT | Performed by: INTERNAL MEDICINE

## 2021-03-10 NOTE — PROGRESS NOTES
March 12, 2021      Patient: Roger Willig   YOB: 1945   Date of Visit 3/9/2021   [facility]# [id number]   MRN# [id number]       REPORT TYPE: Office Letter    DATE OF SERVICE: 03/09/2021    Patricio Moore MD    Dear Dr. Moore:    I understand you for going to be performing a herniorrhaphy for Roger Willig.  We have been seeing Mr. Willig with a diagnosis of atrial fibrillation and he has done very well on higher doses of flecainide 150 mg twice a day.  He also uses rivaroxaban for stroke prophylaxis 20 mg once a day and vitamins.  He has been doing well recently with increased dose of flecainide and has not had any significant arrhythmia recurrences.  We regard him to be at a low cardiovascular risk for the hernia procedure.  Rivaroxaban should be discontinued 2 days prior to his surgery and should be reinitiated as soon as possible after depending on your judgment about bleeding.  Mr. Willig is aware of the fact that he will be at a small stroke risk while anticoagulation   therapy is suspended.  He has had recent noninvasive cardiac studies and there were no other cardiac issues.    On examination today, blood pressure was 134/86 with a heart rate of 65 beats per minute.  His rhythm was regular without ectopic beats.  Respiratory rate was 18 and he weighed 221 pounds.  He lost 15 pounds through dieting since his last visit and looked to be in much better physical condition overall.  His cardiopulmonary examination was unremarkable and unchanged.  His electrocardiogram showed sinus rhythm with normal electrocardiographic intervals.    As said, Mr. Willig is prepared for surgery and I do not anticipate any issues, but he does have a history of atrial fibrillation that may recur either perioperatively or postoperatively and he will require appropriate monitoring.  If you have any questions about any of this, we would be delighted to address them.    I spent approximately 15 minutes with Mr. Willig today reviewing his  clinical situation, going over his medications, performing a history and physical examination, interpreting his electrocardiogram, and making plans for follow-up.  Mr. Willig has an appointment and will see me on 06/08/2021.    Thank you for your consideration.      Sincerely yours,      Ferdinand Thapa MD    CC: Kiko Mensah DO    DD: 03/09/2021 10:30  DT: 03/09/2021 10:32  Voice ID: [id number]/Report ID: [id number]  sp

## 2021-05-24 ENCOUNTER — TELEPHONE (OUTPATIENT)
Dept: CARDIOLOGY | Facility: CLINIC | Age: 76
End: 2021-05-24

## 2021-05-24 NOTE — TELEPHONE ENCOUNTER
Patient is scheduled for open right inguinal hernia repair on 5/27/2021. This is a different surgery.  Can I send your office notes  with preop clearance from 3/9/21?

## 2021-05-24 NOTE — TELEPHONE ENCOUNTER
"We do not \"clear\" patients. You may use my letter with regard to risk and anticoagulation management.  "

## 2021-06-29 ENCOUNTER — OFFICE VISIT (OUTPATIENT)
Dept: CARDIOLOGY | Facility: CLINIC | Age: 76
End: 2021-06-29
Payer: COMMERCIAL

## 2021-06-29 VITALS
SYSTOLIC BLOOD PRESSURE: 116 MMHG | WEIGHT: 212 LBS | OXYGEN SATURATION: 97 % | DIASTOLIC BLOOD PRESSURE: 80 MMHG | HEART RATE: 59 BPM | HEIGHT: 74 IN | BODY MASS INDEX: 27.21 KG/M2

## 2021-06-29 DIAGNOSIS — R00.2 PALPITATIONS: ICD-10-CM

## 2021-06-29 DIAGNOSIS — I48.0 PAROXYSMAL ATRIAL FIBRILLATION (CMS/HCC): ICD-10-CM

## 2021-06-29 DIAGNOSIS — E78.2 MIXED HYPERLIPIDEMIA: Primary | ICD-10-CM

## 2021-06-29 PROCEDURE — 3008F BODY MASS INDEX DOCD: CPT | Performed by: INTERNAL MEDICINE

## 2021-06-29 PROCEDURE — 93000 ELECTROCARDIOGRAM COMPLETE: CPT | Performed by: INTERNAL MEDICINE

## 2021-06-29 PROCEDURE — 99213 OFFICE O/P EST LOW 20 MIN: CPT | Performed by: INTERNAL MEDICINE

## 2021-06-29 ASSESSMENT — CHADS2 SCORE
PRIOR STROKE OR TIA OR THROMBOEMBOLISM: NO
CHF: NO
VASCULAR DISEASE: NO
HYPERTENSION: YES (+1 PT.)
CHADS2 SCORE: 3
SEX: MALE
AGE: 75+ (+2 PT.)
DIABETES: NO

## 2021-06-29 ASSESSMENT — PAIN SCALES - GENERAL: PAINLEVEL: 0-NO PAIN

## 2021-06-29 NOTE — PROGRESS NOTES
July 1, 2021      Patient: Roger Willig   YOB: 1945   Date of Visit 6/29/2021   [facility]# [id number]   MRN# [id number]       REPORT TYPE: Office Letter    DATE OF SERVICE: 06/29/2021    Cecil Gonzales DO    Dear Dr. Gonzales:    Roger Willig was in our cardiology outpatient offices today.  He continues to do well from the cardiovascular perspective.  He got through two hernia operations without complications.  He is systemically anticoagulated with RIVAROXABAN.  His other medicines are PRAVASTATIN, and FLECAINIDE.  He had a number of questions today about the advisability of proceeding to a catheter ablation procedure.  He is concerned about the eventual development of resistant atrial fibrillation.  After he asked many questions, he decided to sit tight with medication since he is doing very well without any clinical recurrences.  He is aware of the risks of proceeding to an ablation procedure and the likelihood of success.  We are up to date with regard to noninvasive cardiac studies.  He has not had any other cardiac issues.  He reassured me that he does see you on a regular basis for routine laboratory studies.    On physical examination, blood pressure was 116/80 with a heart rate of 59 beats per minute.  His rhythm was regular without ectopic beats.  Respiratory rate was 18 and he weighed 212 pounds, 9 pounds less at his last visit.  His lung fields were clear.  Cardiac examination was unremarkable and unchanged.  He had no signs of volume overload.  His peripheral vasculature was intact.  His electrocardiogram showed sinus rhythm with normal electrocardiographic intervals and a mild intraventricular conduction delay, probably due to flecainide.    I made no changes in Mr. Willig's medical program and did not order any tests.  His next visit here will be in 6 months if all is well.  In the meantime, I strongly encouraged him to call should he have any important change in his clinical situation or  should he have questions or concerns.  He will, of course, maintain his usual contact with you and his other physicians.    I spent approximately 25 minutes with Mr. Willig's case this morning, taking a history, performing a physical examination, interpreting his electrocardiogram, making arrangements for a follow-up visit, answering a number of questions about his therapeutics, and documenting the above.    Thank you for your consideration and for sharing Mr. Willig's care with us.    Sincerely yours,      Ferdinand Thapa MD    DD: 06/29/2021 11:23  DT: 06/29/2021 11:25  Voice ID: [id number]/Report ID: [id number]  bs

## 2022-01-04 ENCOUNTER — OFFICE VISIT (OUTPATIENT)
Dept: CARDIOLOGY | Facility: CLINIC | Age: 77
End: 2022-01-04
Payer: COMMERCIAL

## 2022-01-04 VITALS
SYSTOLIC BLOOD PRESSURE: 124 MMHG | HEIGHT: 73 IN | HEART RATE: 62 BPM | BODY MASS INDEX: 28.49 KG/M2 | WEIGHT: 215 LBS | OXYGEN SATURATION: 98 % | DIASTOLIC BLOOD PRESSURE: 88 MMHG

## 2022-01-04 DIAGNOSIS — R00.2 PALPITATIONS: ICD-10-CM

## 2022-01-04 DIAGNOSIS — E78.00 PURE HYPERCHOLESTEROLEMIA: ICD-10-CM

## 2022-01-04 DIAGNOSIS — E78.2 MIXED HYPERLIPIDEMIA: Primary | ICD-10-CM

## 2022-01-04 DIAGNOSIS — I48.0 PAROXYSMAL ATRIAL FIBRILLATION (CMS/HCC): ICD-10-CM

## 2022-01-04 PROCEDURE — 93000 ELECTROCARDIOGRAM COMPLETE: CPT | Performed by: INTERNAL MEDICINE

## 2022-01-04 PROCEDURE — 99213 OFFICE O/P EST LOW 20 MIN: CPT | Performed by: INTERNAL MEDICINE

## 2022-01-04 PROCEDURE — 3008F BODY MASS INDEX DOCD: CPT | Performed by: INTERNAL MEDICINE

## 2022-01-04 NOTE — PROGRESS NOTES
January 8, 2022      Patient: Roger Willig   YOB: 1945   Date of Visit 1/4/2022   [facility]# [id number]   MRN# [id number]       REPORT TYPE: Office Letter    DATE OF SERVICE: 01/04/2022    Cecil Gonzales D.O.    Dear Dr. Gonzales:    Roger Willig was in our cardiology outpatient offices today for a follow-up appointment.  He has continued to do well from our perspective without any symptoms to suggest recurrent atrial fibrillation on FLECAINIDE, PRAVASTATIN, and RIVAROXABAN.  We are up to date with regard to noninvasive cardiac studies. He told me that he does see you on a regular basis and that you have been satisfied with his general medical assessments including his laboratory testing.  He has been able to pursue his usual activities and has kept himself in good physical condition, walking several miles on a daily basis.  I know that you were concerned recently about a murmur that corresponds to aortic sclerosis on his echocardiogram.  He does not have hemodynamically significant valvular disease.  There were no other cardiac issues.    On physical examination, blood pressure was 124/88, with a heart rate of 62 beats per minute and his rhythm was regular without ectopic beats.  Respiratory rate was 18 and he weighed 215 pounds.  His lung fields were clear.  His cardiac examination was remarkable only for the systolic murmur that I mentioned.  He had no other auscultatory findings.  He had no signs of volume overload and his peripheral vasculature was intact. His electrocardiogram showed sinus rhythm with a prolonged ND interval 212 milliseconds, a mild intraventricular conduction delay due to flecainide, and nonspecific ST-T wave abnormalities that had been seen previously.    Mr. Willig then is stable from our perspective and so I did not change his medicines or order any laboratory tests.  He will return to our offices in about 6 months.  Later this year, we will repeat an exercise echocardiogram for  surveillance.  In the meantime, he will maintain his usual contact with you and we will be available to answer questions or to address concerns.    I spent approximately 25 minutes with Mr. Willig's case this morning, taking a history, performing a physical examination, interpreting his electrocardiogram, going through his list of medications, reviewing his previous diagnostic studies and laboratory tests, making arrangements for follow-up, and documenting all of the above.    Thank you for your consideration and for sharing Mr. Willig's care.    Sincerely yours,      Ferdinand Thapa MD    DD: 01/04/2022 09:10  DT: 01/04/2022 09:14  Voice ID: [id number]/Report ID: [id number]  bs

## 2022-06-09 ENCOUNTER — TELEPHONE (OUTPATIENT)
Dept: CARDIOLOGY | Facility: CLINIC | Age: 77
End: 2022-06-09
Payer: COMMERCIAL

## 2022-06-09 NOTE — TELEPHONE ENCOUNTER
aPPT 7/12/22- Dr. Elier ARBOLEDA for patient on both numbers listed in patient chart to call us back and r/s his appt to later dates or if he can come in the pm same day.     Janna

## 2022-07-18 ENCOUNTER — TELEPHONE (OUTPATIENT)
Dept: SCHEDULING | Facility: CLINIC | Age: 77
End: 2022-07-18
Payer: COMMERCIAL

## 2022-07-18 NOTE — TELEPHONE ENCOUNTER
Patient received a call on Friday afternoon stating Dr Thapa will not be in the office at 8am tomorrow on 7/19.     Please call pt at [phone] to advise him of his appnt status with Dr Thapa on 7/19. ty

## 2022-07-19 ENCOUNTER — OFFICE VISIT (OUTPATIENT)
Dept: CARDIOLOGY | Facility: CLINIC | Age: 77
End: 2022-07-19
Payer: COMMERCIAL

## 2022-07-19 VITALS
HEIGHT: 73 IN | HEART RATE: 58 BPM | WEIGHT: 218 LBS | BODY MASS INDEX: 28.89 KG/M2 | SYSTOLIC BLOOD PRESSURE: 120 MMHG | OXYGEN SATURATION: 97 % | DIASTOLIC BLOOD PRESSURE: 82 MMHG | RESPIRATION RATE: 18 BRPM

## 2022-07-19 DIAGNOSIS — R00.2 PALPITATIONS: ICD-10-CM

## 2022-07-19 DIAGNOSIS — E78.2 MIXED HYPERLIPIDEMIA: Primary | ICD-10-CM

## 2022-07-19 DIAGNOSIS — I48.0 PAROXYSMAL ATRIAL FIBRILLATION (CMS/HCC): ICD-10-CM

## 2022-07-19 PROCEDURE — 93000 ELECTROCARDIOGRAM COMPLETE: CPT | Performed by: INTERNAL MEDICINE

## 2022-07-19 PROCEDURE — 3008F BODY MASS INDEX DOCD: CPT | Performed by: INTERNAL MEDICINE

## 2022-07-19 PROCEDURE — 99213 OFFICE O/P EST LOW 20 MIN: CPT | Performed by: INTERNAL MEDICINE

## 2022-07-19 ASSESSMENT — CHADS2 SCORE
VASCULAR DISEASE: NO
HYPERTENSION: NO
CHADS2 SCORE: 2
PRIOR STROKE OR TIA OR THROMBOEMBOLISM: NO
SEX: MALE
CHF: NO
AGE: 75+ (+2 PT.)
DIABETES: NO

## 2022-07-19 NOTE — PROGRESS NOTES
July 20, 2022      Patient: Roger Willig   YOB: 1945   Date of Visit 7/19/2022   [facility]# [id number]   MRN# [id number]       REPORT TYPE: Office Letter    DATE OF SERVICE: 07/19/2022    CROW ZELAYA DO    Dear Dr. Zelaya:    Roger Willig was in our cardiology outpatient offices today for a follow-up appointment.  He continues to do well from the cardiovascular perspective.  He has had no symptoms to suggest recurrent atrial arrhythmia or ischemia or heart failure.  He has been adherent to his medical program that currently includes FLECAINIDE 150 mg twice per day, PRAVASTATIN, and XARELTO 20 mg per day with dinner.  He has been able to pursue all of his normal activities and has kept himself in good physical condition.  He had no other cardiovascular issues.  He told me that he does see you on a regular basis and that laboratory testing has been acceptable including metabolic profile and lipids.  I looked at some values from last spring that look to be in good order.  There were no other cardiovascular issues.    On physical examination, blood pressure was 120/82 with a heart rate of 58 beats per minute.  His rhythm was regular with occasional ectopic beats.  Respiratory rate was 18 and he weighed 218 pounds.  His cardiopulmonary examination was unremarkable except for a fourth sound on cardiac auscultation.  His lung fields were clear.  He had no signs of volume overload and his peripheral vasculature was intact. His electrocardiogram showed sinus rhythm with a prolonged UT interval of 228 milliseconds, occasional APDs and a mild interventricular conduction delay due to FLECAINIDE.    Mr. Willig is stable and so I did not change his medicines.  I did order an exercise echocardiogram that will be carried out in January, 2023 followed by a visit with me in February.  We will, of course, be available to answer questions or to address concerns in the interim.    I spent approximately 30 minutes with  Mr. Willig's case this morning, taking a history, performing a physical examination, interpreting his electrocardiogram, going through his medications, reviewing previous laboratory and diagnostic studies, arranging a new diagnostic study, making arrangements for follow-up, and documenting all of the above.    I am pleased that Mr. Willig is stable on his current program. Thank you for sharing his care with us.    Best regards,      Ferdinand Thapa MD    DD: 07/19/2022 08:34  DT: 07/19/2022 08:43  Voice ID: [id number]/Report ID: [id number]  am

## 2022-08-15 NOTE — TELEPHONE ENCOUNTER
Mode of arrival (squad #, walk in, police, etc) : walk in        Chief complaint(s): abd pain        Arrival Note (brief scenario, treatment PTA, etc). : Pt states she has gastritis and thinsk she is having a flare up. Pt appears uncomfortable. Pt reports ten episodes of vomiting today. C= \"Have you ever felt that you should Cut down on your drinking? \"  No  A= \"Have people Annoyed you by criticizing your drinking? \"  No  G= \"Have you ever felt bad or Guilty about your drinking? \"  No  E= \"Have you ever had a drink as an Eye-opener first thing in the morning to steady your nerves or to help a hangover? \"  No      Deferred []      Reason for deferring: N/A    *If yes to two or more: probable alcohol abuse. * agree

## 2022-10-26 ENCOUNTER — APPOINTMENT (RX ONLY)
Dept: URBAN - METROPOLITAN AREA CLINIC 374 | Facility: CLINIC | Age: 77
Setting detail: DERMATOLOGY
End: 2022-10-26

## 2022-10-26 DIAGNOSIS — B07.8 OTHER VIRAL WARTS: ICD-10-CM

## 2022-10-26 DIAGNOSIS — L81.4 OTHER MELANIN HYPERPIGMENTATION: ICD-10-CM

## 2022-10-26 DIAGNOSIS — L82.1 OTHER SEBORRHEIC KERATOSIS: ICD-10-CM

## 2022-10-26 DIAGNOSIS — D22 MELANOCYTIC NEVI: ICD-10-CM

## 2022-10-26 DIAGNOSIS — L57.8 OTHER SKIN CHANGES DUE TO CHRONIC EXPOSURE TO NONIONIZING RADIATION: ICD-10-CM

## 2022-10-26 DIAGNOSIS — D18.0 HEMANGIOMA: ICD-10-CM

## 2022-10-26 PROBLEM — D22.71 MELANOCYTIC NEVI OF RIGHT LOWER LIMB, INCLUDING HIP: Status: ACTIVE | Noted: 2022-10-26

## 2022-10-26 PROBLEM — D22.61 MELANOCYTIC NEVI OF RIGHT UPPER LIMB, INCLUDING SHOULDER: Status: ACTIVE | Noted: 2022-10-26

## 2022-10-26 PROBLEM — D22.62 MELANOCYTIC NEVI OF LEFT UPPER LIMB, INCLUDING SHOULDER: Status: ACTIVE | Noted: 2022-10-26

## 2022-10-26 PROBLEM — D22.72 MELANOCYTIC NEVI OF LEFT LOWER LIMB, INCLUDING HIP: Status: ACTIVE | Noted: 2022-10-26

## 2022-10-26 PROBLEM — D22.5 MELANOCYTIC NEVI OF TRUNK: Status: ACTIVE | Noted: 2022-10-26

## 2022-10-26 PROBLEM — D18.01 HEMANGIOMA OF SKIN AND SUBCUTANEOUS TISSUE: Status: ACTIVE | Noted: 2022-10-26

## 2022-10-26 PROCEDURE — ? BENIGN DESTRUCTION

## 2022-10-26 PROCEDURE — ? PHOTO-DOCUMENTATION

## 2022-10-26 PROCEDURE — ? FULL BODY SKIN EXAM

## 2022-10-26 PROCEDURE — ? ADDITIONAL NOTES

## 2022-10-26 PROCEDURE — 17110 DESTRUCTION B9 LES UP TO 14: CPT

## 2022-10-26 PROCEDURE — 99213 OFFICE O/P EST LOW 20 MIN: CPT | Mod: 25

## 2022-10-26 PROCEDURE — ? COUNSELING

## 2022-10-26 ASSESSMENT — LOCATION ZONE DERM
LOCATION ZONE: TRUNK
LOCATION ZONE: ARM
LOCATION ZONE: FACE
LOCATION ZONE: NECK
LOCATION ZONE: LEG

## 2022-10-26 ASSESSMENT — LOCATION SIMPLE DESCRIPTION DERM
LOCATION SIMPLE: RIGHT THIGH
LOCATION SIMPLE: LEFT THIGH
LOCATION SIMPLE: LEFT UPPER ARM
LOCATION SIMPLE: LEFT CHEEK
LOCATION SIMPLE: RIGHT FOREARM
LOCATION SIMPLE: LEFT POSTERIOR UPPER ARM
LOCATION SIMPLE: RIGHT POSTERIOR UPPER ARM
LOCATION SIMPLE: ABDOMEN
LOCATION SIMPLE: RIGHT PRETIBIAL REGION
LOCATION SIMPLE: LEFT PRETIBIAL REGION
LOCATION SIMPLE: UPPER BACK
LOCATION SIMPLE: RIGHT ANTERIOR NECK
LOCATION SIMPLE: LEFT FOREARM

## 2022-10-26 ASSESSMENT — LOCATION DETAILED DESCRIPTION DERM
LOCATION DETAILED: PERIUMBILICAL SKIN
LOCATION DETAILED: LEFT CENTRAL MALAR CHEEK
LOCATION DETAILED: LEFT LATERAL ABDOMEN
LOCATION DETAILED: RIGHT DISTAL POSTERIOR UPPER ARM
LOCATION DETAILED: LEFT ANTERIOR PROXIMAL THIGH
LOCATION DETAILED: RIGHT PROXIMAL PRETIBIAL REGION
LOCATION DETAILED: LEFT ANTERIOR DISTAL THIGH
LOCATION DETAILED: RIGHT PROXIMAL DORSAL FOREARM
LOCATION DETAILED: LEFT PROXIMAL DORSAL FOREARM
LOCATION DETAILED: LEFT DISTAL POSTERIOR UPPER ARM
LOCATION DETAILED: RIGHT DISTAL DORSAL FOREARM
LOCATION DETAILED: RIGHT ANTERIOR PROXIMAL THIGH
LOCATION DETAILED: INFERIOR THORACIC SPINE
LOCATION DETAILED: LEFT PROXIMAL PRETIBIAL REGION
LOCATION DETAILED: EPIGASTRIC SKIN
LOCATION DETAILED: RIGHT CLAVICULAR NECK
LOCATION DETAILED: LEFT DISTAL DORSAL FOREARM

## 2022-10-26 NOTE — PROCEDURE: BENIGN DESTRUCTION
Medical Necessity Clause: This procedure was medically necessary because the lesions that were treated were:
Render Post-Care Instructions In Note?: no
Medical Necessity Information: It is in your best interest to select a reason for this procedure from the list below. All of these items fulfill various CMS LCD requirements except the new and changing color options.
Post-Care Instructions: I reviewed with the patient in detail post-care instructions. Patient is to wear sunprotection, and avoid picking at any of the treated lesions. Pt may apply Vaseline to crusted or scabbing areas.
Treatment Number (Will Not Render If 0): 1
Anesthesia Volume In Cc: 0.5
Detail Level: Detailed
Consent: The patient's consent was obtained including but not limited to risks of crusting, scabbing, blistering, scarring, darker or lighter pigmentary change, recurrence, incomplete removal and infection.

## 2022-11-16 ENCOUNTER — TELEPHONE (OUTPATIENT)
Dept: CARDIOLOGY | Facility: CLINIC | Age: 77
End: 2022-11-16
Payer: COMMERCIAL

## 2023-01-11 ENCOUNTER — HOSPITAL ENCOUNTER (OUTPATIENT)
Dept: CARDIOLOGY | Facility: CLINIC | Age: 78
Discharge: HOME | End: 2023-01-11
Payer: COMMERCIAL

## 2023-01-11 VITALS
BODY MASS INDEX: 30.48 KG/M2 | DIASTOLIC BLOOD PRESSURE: 62 MMHG | SYSTOLIC BLOOD PRESSURE: 116 MMHG | HEIGHT: 73 IN | WEIGHT: 230 LBS

## 2023-01-11 DIAGNOSIS — I48.0 PAROXYSMAL ATRIAL FIBRILLATION (CMS/HCC): ICD-10-CM

## 2023-01-11 DIAGNOSIS — E78.2 MIXED HYPERLIPIDEMIA: ICD-10-CM

## 2023-01-11 LAB
AORTIC ROOT ANNULUS: 3.8 CM
ASCENDING AORTA: 4.2 CM
AV PEAK GRADIENT: 9 MMHG
AV PEAK VELOCITY-S: 1.46 M/S
AV REG PEAK VEL: 4.06 M/S
AV REGURGITATION PRESSURE HALF TIME: 901 MS
AV VALVE AREA: 1.77 CM2
BSA FOR ECHO PROCEDURE: 2.32 M2
E WAVE DECELERATION TIME: 275 MS
E/A RATIO: 1.1
E/E' RATIO: 9.5
E/LAT E' RATIO: 7.5
EDV (BP): 118 CM3
EF (A4C): 61.8 %
EF A2C: 67.3 %
EJECTION FRACTION: 65.9 %
EST RIGHT VENT SYSTOLIC PRESSURE BY TRICUSPID REGURGITATION JET: 26 MMHG
ESV (BP): 40.2 CM3
LA ESV (BP): 59.3 CM3
LA ESV INDEX (A2C): 25.04 CM3/M2
LA ESV INDEX (BP): 25.56 CM3/M2
LA/AORTA RATIO: 0.87
LAAS-AP2: 20.8 CM2
LAAS-AP4: 21.3 CM2
LAD 2D: 3.3 CM
LALD A4C: 5.86 CM
LALD A4C: 5.94 CM
LAV-S: 58.1 CM3
LEFT ATRIUM VOLUME INDEX: 25.91 CM3/M2
LEFT ATRIUM VOLUME: 60.1 CM3
LEFT VENTRICLE DIASTOLIC VOLUME INDEX: 54.31 CM3/M2
LEFT VENTRICLE DIASTOLIC VOLUME: 126 CM3
LEFT VENTRICLE SYSTOLIC VOLUME INDEX: 20.73 CM3/M2
LEFT VENTRICLE SYSTOLIC VOLUME: 48.1 CM3
LV DIASTOLIC VOLUME: 104 CM3
LV ESV (APICAL 2 CHAMBER): 34.1 CM3
LVAD-AP2: 33.3 CM2
LVAD-AP4: 37.8 CM2
LVAS-AP2: 17.8 CM2
LVAS-AP4: 20.5 CM2
LVEDVI(A2C): 44.83 CM3/M2
LVEDVI(BP): 50.86 CM3/M2
LVESVI(A2C): 14.7 CM3/M2
LVESVI(BP): 17.33 CM3/M2
LVLD-AP2: 8.68 CM
LVLD-AP4: 9.25 CM
LVLS-AP2: 7.5 CM
LVLS-AP4: 7.65 CM
LVOT 2D: 2.1 CM
LVOT A: 3.46 CM2
LVOT PEAK VELOCITY: 0.95 M/S
LVOT PG: 4 MMHG
MLH CV ECHO AVA INDEX VELOCITY RATIO: 0.8
MV E'TISSUE VEL-LAT: 0.11 M/S
MV E'TISSUE VEL-MED: 0.09 M/S
MV PEAK A VEL: 0.74 M/S
MV PEAK E VEL: 0.81 M/S
PV PEAK GRADIENT: 3 MMHG
PV PV: 0.92 M/S
RAP: 5 MMHG
RVOT VMAX: 0.76 M/S
RVOT VTI: 17.4 CM
SEPTAL TISSUE DOPPLER FREE WALL LATE DIA VELOCITY (APICAL 4 CHAMBER VIEW): 0.12 M/S
STRESS BASELINE BP: NORMAL MMHG
STRESS BASELINE HR: 58 BPM
STRESS ECHO POST RECOVERY HR: 104 BPM
STRESS O2 SAT REST: 98 %
STRESS PERCENT HR: 90 %
STRESS POST ESTIMATED WORKLOAD: 10.1 METS
STRESS POST EXERCISE DUR MIN: 7 MIN
STRESS POST EXERCISE DUR SEC: 9 SEC
STRESS POST O2 SAT PEAK: 98 %
STRESS POST PEAK BP: NORMAL MMHG
STRESS POST PEAK HR: 127 BPM
STRESS TARGET HR: 120 BPM
TR MAX PG: 21.16 MMHG
TRICUSPID VALVE PEAK REGURGITATION VELOCITY: 2.3 M/S

## 2023-01-11 PROCEDURE — 93320 DOPPLER ECHO COMPLETE: CPT | Performed by: INTERNAL MEDICINE

## 2023-01-11 PROCEDURE — 93350 STRESS TTE ONLY: CPT | Performed by: INTERNAL MEDICINE

## 2023-01-11 PROCEDURE — 93325 DOPPLER ECHO COLOR FLOW MAPG: CPT | Performed by: INTERNAL MEDICINE

## 2023-02-28 ENCOUNTER — OFFICE VISIT (OUTPATIENT)
Dept: CARDIOLOGY | Facility: CLINIC | Age: 78
End: 2023-02-28
Payer: COMMERCIAL

## 2023-02-28 VITALS
RESPIRATION RATE: 18 BRPM | HEIGHT: 73 IN | HEART RATE: 55 BPM | BODY MASS INDEX: 28.89 KG/M2 | SYSTOLIC BLOOD PRESSURE: 124 MMHG | OXYGEN SATURATION: 98 % | WEIGHT: 218 LBS | DIASTOLIC BLOOD PRESSURE: 88 MMHG

## 2023-02-28 DIAGNOSIS — R00.2 PALPITATIONS: ICD-10-CM

## 2023-02-28 DIAGNOSIS — E78.2 MIXED HYPERLIPIDEMIA: Primary | ICD-10-CM

## 2023-02-28 DIAGNOSIS — E78.00 PURE HYPERCHOLESTEROLEMIA: ICD-10-CM

## 2023-02-28 DIAGNOSIS — I48.0 PAROXYSMAL ATRIAL FIBRILLATION (CMS/HCC): ICD-10-CM

## 2023-02-28 PROCEDURE — 93000 ELECTROCARDIOGRAM COMPLETE: CPT | Performed by: INTERNAL MEDICINE

## 2023-02-28 PROCEDURE — 99213 OFFICE O/P EST LOW 20 MIN: CPT | Performed by: INTERNAL MEDICINE

## 2023-02-28 PROCEDURE — 3008F BODY MASS INDEX DOCD: CPT | Performed by: INTERNAL MEDICINE

## 2023-02-28 NOTE — PROGRESS NOTES
March 4, 2023      Patient: Roger Willig   YOB: 1945   Date of Visit 2/28/2023   [facility]# [id number]   MRN# [id number]       REPORT TYPE: Office Letter    DATE OF SERVICE: 02/28/2023    CROW ZELAYA DO    Dear Dr. Zelaya:    Roger Willig was in our cardiology outpatient offices today for a follow-up appointment.  He continues to do very well from the cardiovascular perspective.  He has had no symptoms to suggest recurrent atrial arrhythmia, ischemia or heart failure. As we had requested, he had a stress echocardiogram in January and I am happy to report that the test was acceptable in all respects.  He had no evidence of stress-induced myocardial ischemia or provoked arrhythmia and did not have important structural heart disease.  Left ventricular function was well preserved.  He is using FLECAINIDE for atrial fibrillation suppression in addition to RIVAROXABAN for stroke prophylaxis and PRAVASTATIN for cholesterol control.  He had laboratory work recently through your office and the numbers were favorable.  He had relatively normal routine chemistries and lipids and CBC.  There were no other cardiac issues.    On physical examination, blood pressure was 124/88 with a heart rate of 55 beats per minute.  His rhythm was regular without ectopic beats.  Respiratory rate was 18 and he weighed 228 pounds, about 10 pounds more than last time.  His cardiopulmonary examination was unremarkable.  He had normal heart tones and no murmurs.  His lung fields were clear.  He had no signs of volume overload and his peripheral vasculature was intact. His electrocardiogram showed sinus rhythm with a prolonged VT interval of 228 milliseconds and no other abnormalities.    Mr. Willig is stable from our perspective and so I did not change his medicines or order any further laboratory studies for the time being.  His next visit at our office will be in 6 months.  Since I will be withdrawing from clinical practice in  July, he will see Dr. Sierra Gandhi at our [place] office in August or September.  I will, of course, be available to answer questions or to address concerns during this very important transition of care.    I spent approximately 25 minutes with Mr. Willig's case this morning, taking a history, performing a physical examination, interpreting his electrocardiogram, going through his medication list, reviewing the results of his latest diagnostic study and answering a number of questions, making some suggestions about follow-up in the fall, and documenting all of the above.    I am pleased that Mr. Willig has remained stable on a conservative medical program, and I wish you and Dr. Gandhi all the best in his continuing management.    Best regards,      Ferdinand Thapa MD    DD: 02/28/2023 08:42  DT: 02/28/2023 13:50  Voice ID: [id number]/Report ID: [id number]  am

## 2023-03-05 NOTE — TELEPHONE ENCOUNTER
Dr. Thapa,  patient had an EKG done on 3/9/2021 which was sinus bradycardia.  Last Telemed appointment 2/2/21.  He needs Cv risk stratification for his hernia surgery.   Low risk surgery and hold Xarelto 2 days preop with routine instructions?     No

## 2023-04-11 ENCOUNTER — TELEPHONE (OUTPATIENT)
Dept: SCHEDULING | Facility: CLINIC | Age: 78
End: 2023-04-11
Payer: COMMERCIAL

## 2023-04-11 NOTE — TELEPHONE ENCOUNTER
Pt is asking to schedule OV w/ fellow recommended by Dr. Thapa.    Pt does not recall name.     Pt can be reached at [phone].     Ty.

## 2023-08-21 ENCOUNTER — OFFICE VISIT (OUTPATIENT)
Dept: CARDIOLOGY | Facility: CLINIC | Age: 78
End: 2023-08-21
Payer: COMMERCIAL

## 2023-08-21 VITALS
WEIGHT: 227 LBS | HEART RATE: 53 BPM | OXYGEN SATURATION: 96 % | RESPIRATION RATE: 16 BRPM | DIASTOLIC BLOOD PRESSURE: 64 MMHG | HEIGHT: 72 IN | SYSTOLIC BLOOD PRESSURE: 130 MMHG | BODY MASS INDEX: 30.75 KG/M2

## 2023-08-21 DIAGNOSIS — I48.91 ATRIAL FIBRILLATION, UNSPECIFIED TYPE (CMS/HCC): Primary | ICD-10-CM

## 2023-08-21 PROCEDURE — 93000 ELECTROCARDIOGRAM COMPLETE: CPT | Performed by: INTERNAL MEDICINE

## 2023-08-21 PROCEDURE — 3008F BODY MASS INDEX DOCD: CPT | Performed by: INTERNAL MEDICINE

## 2023-08-21 PROCEDURE — 99213 OFFICE O/P EST LOW 20 MIN: CPT | Performed by: INTERNAL MEDICINE

## 2023-08-21 ASSESSMENT — ENCOUNTER SYMPTOMS
RESPIRATORY NEGATIVE: 1
GASTROINTESTINAL NEGATIVE: 1
HEMATOLOGIC/LYMPHATIC NEGATIVE: 1
EYES NEGATIVE: 1
CARDIOVASCULAR NEGATIVE: 1
CONSTITUTIONAL NEGATIVE: 1
ENDOCRINE NEGATIVE: 1
NEUROLOGICAL NEGATIVE: 1
PSYCHIATRIC NEGATIVE: 1
ALLERGIC/IMMUNOLOGIC NEGATIVE: 1
MUSCULOSKELETAL NEGATIVE: 1

## 2023-08-21 NOTE — PATIENT INSTRUCTIONS
- Please refrain from taking additional doses of flecainide (max daily dose is not to exceed 300 mg).   - Please reach out to our office if your episodes (palpitations/skipped beats) persists without rest, [phone]

## 2023-08-21 NOTE — PROGRESS NOTES
Cardiology  Office Progress Note           HPI     Roger Willig is a 78 y.o. male who presents to the office for evaluation and management of atrial fibrillation.  He has had paroxysmal atrial fibrillation for many years and is treated with flecainide 150 mg twice daily.  He is also on rivaroxaban 20 mg daily for stroke prophylaxis.  Lastly he is on pravastatin for hyperlipidemia.  He is very active and states that he walks 6 to 7 miles daily and goes to the gym a few times a week.  He feels well and only has palpitations related to the atrial fibrillation on occasion.  When he does, he takes an extra half dose of his flecainide and goes to bed which usually resolves his symptoms.  He denies cardiac symptoms including chest pain, shortness of breath and dizziness.  He does get bilateral lower extremity edema intermittently which always resolves in 1 to 2 days and he cannot associate it with any particular activity.    Past Medical History:   Diagnosis Date   • Arrhythmia    • Arthritis    • Fatigue    • Lipid disorder    • Palpitations        Past Surgical History:   Procedure Laterality Date   • COSMETIC SURGERY     • EYE SURGERY     • HERNIA REPAIR  04/01/2021   • HERNIA REPAIR  05/27/2021   • TONSILLECTOMY         Social History     Tobacco Use   • Smoking status: Never   • Smokeless tobacco: Never   Vaping Use   • Vaping Use: Never used   Substance Use Topics   • Alcohol use: No   • Drug use: Defer       Family History   Problem Relation Age of Onset   • Stroke Biological Mother    • Heart disease Biological Father    • Prostate cancer Biological Father        Allergies:  No known allergies    Current Outpatient Medications   Medication Sig Dispense Refill   • flecainide (TAMBOCOR) 100 mg tablet Take 1.5 tablets (150 mg total) by mouth 2 (two) times a day. 90 tablet 6   • multivitamin (THERAGRAN) tablet daily.     • pravastatin (PravachoL) 40 mg tablet Take 1 tablet (40 mg total) by mouth daily. 30 tablet 6    • rivaroxaban (XARELTO) 20 mg tablet Take 1 tablet (20 mg total) by mouth daily. 30 tablet 6     No current facility-administered medications for this visit.       Review of Systems   Constitutional: Negative.   HENT: Negative.    Eyes: Negative.    Cardiovascular: Negative.    Respiratory: Negative.    Endocrine: Negative.    Hematologic/Lymphatic: Negative.    Skin: Negative.    Musculoskeletal: Negative.    Gastrointestinal: Negative.    Genitourinary: Negative.    Neurological: Negative.    Psychiatric/Behavioral: Negative.    Allergic/Immunologic: Negative.        A comprehensive 15-point review of systems was performed and was negative unless specifically mentioned in the History of Present Illness.    Objective     Vitals:    08/21/23 1146   BP: 130/64   Pulse: (!) 53   Resp: 16   SpO2: 96%       Wt Readings from Last 3 Encounters:   08/21/23 103 kg (227 lb)   02/28/23 98.9 kg (218 lb)   01/11/23 104 kg (230 lb)       Body mass index is 30.79 kg/m².    Physical Exam  Vitals and nursing note reviewed.   Constitutional:       Appearance: Normal appearance.   HENT:      Head: Normocephalic and atraumatic.      Right Ear: External ear normal.      Left Ear: External ear normal.      Nose: Nose normal.      Mouth/Throat:      Mouth: Mucous membranes are moist.      Pharynx: Oropharynx is clear.   Eyes:      Extraocular Movements: Extraocular movements intact.      Conjunctiva/sclera: Conjunctivae normal.      Pupils: Pupils are equal, round, and reactive to light.   Cardiovascular:      Rate and Rhythm: Normal rate and regular rhythm.      Pulses: Normal pulses.      Heart sounds: Normal heart sounds.   Pulmonary:      Effort: Pulmonary effort is normal.      Breath sounds: Normal breath sounds.   Abdominal:      General: Abdomen is flat. Bowel sounds are normal.      Palpations: Abdomen is soft.   Musculoskeletal:         General: Normal range of motion.      Cervical back: Normal range of motion and neck  supple.      Right lower leg: Edema present.      Left lower leg: Edema present.   Lymphadenopathy:      Cervical: No cervical adenopathy.   Skin:     General: Skin is warm and dry.   Neurological:      General: No focal deficit present.      Mental Status: He is alert and oriented to person, place, and time. Mental status is at baseline.   Psychiatric:         Mood and Affect: Mood normal.         Behavior: Behavior normal.         Thought Content: Thought content normal.         Judgment: Judgment normal.          Electrocardiogram today demonstrates sinus rhythm at a rate of 49 bpm with a first-degree AV block and IL interval of 230 ms with a normal axis and repolarization.      Endocrine  Lab Results   Component Value Date    HGBA1C 5.6 12/08/2022       Cardiac Imaging    ECHOCARDIOGRAM STRESS TEST 01/11/2023    Interpretation Summary  •  Stress Findings: An exercise stress test was performed following the Dung protocol. The patient demonstrated above average exercise capacity. The patient reported shortness of breath during the stress test. Onset of symptoms occurred at stage 2 of the protocol. The patient reached stage 3. The patient achieved the target heart rate. Blood pressure and heart rate demonstrated a physiologic response to exercise. The patient experienced no angina calculating an angina index of 0.  •  Left Ventricle - Resting: Normal ventricle size. Normal wall thickness. Estimated EF 60-65%. No regional wall motion abnormalities. Normal diastolic filling pattern for age.  •  Right Ventricle - Resting: Normal ventricle size. Normal systolic function.  •  Left Atrium - Resting: Normal sized atrium.  •  Right Atrium - Resting: Normal sized atrium.  •  Aortic Valve - Resting: Tricuspid valve.  Sclerotic leaflets. Mild regurgitation. No stenosis.  •  Mitral Valve - Resting: Normal leaflet structure. Mild regurgitation.  •  Tricuspid Valve - Resting: Normal structure. Trace regurgitation. Estimated  RVSP = 26 mmHg.  •  Pulmonic Valve - Resting: Normal structure. Trace regurgitation.  •  Aorta - Resting: Dilatation of the ascending aorta.  4.3 cm unchanged from 2017  •  Response to Stress: ECG was diagnostic. There was no ST segment deviation noted during stress. There were no arrhythmias during stress. There were no arrhythmias during recovery. No RBBB at end of recovery.  Sinus Fredrick with 1st degree AV Block  •  Left Ventricle - Post-Stress: Estimated EF 75%. No regional wall motion abnormalities.    No evidence of ischemia with stress echocardiogram  The patient completed 10 METS of exercise achieved target heart rate  Baseline EKG incomplete right bundle branch block mild QRS widening with exercise no changes diagnostic of ischemia no arrhythmias  Baseline echocardiogram dilated aortic root 4.3 cm other chamber sizes normal.  Unchanged from 2017  No regional wall motion abnormalities preserved ejection fraction 60 to 65%  Trileaflet aortic valve aortic sclerosis, mild aortic insufficiency  Mild mitral regurgitation mild tricuspid regurgitation estimated pulmonary systolic pressure normal at 25 mmHg  All walls became hyperdynamic with exercise      Assessment/Plan:   Mr. Well-leg is a very pleasant 78-year-old man with a history of paroxysmal atrial fibrillation, hypertension, and hyperlipidemia.  He is on appropriate medical therapy, however, I have recommended that he stop taking extra doses of flecainide during A-fib episodes.  I have asked him to wait them out and try to rest instead.  If the episodes do not resolve in a timely manner, I have encouraged him to call my office for further recommendations.  He will return to the office in 6 months for routine follow-up.              Sierra Gandhi MD  8/21/2023

## 2023-10-26 ENCOUNTER — APPOINTMENT (RX ONLY)
Dept: URBAN - METROPOLITAN AREA CLINIC 374 | Facility: CLINIC | Age: 78
Setting detail: DERMATOLOGY
End: 2023-10-26

## 2023-10-26 DIAGNOSIS — B07.8 OTHER VIRAL WARTS: ICD-10-CM

## 2023-10-26 DIAGNOSIS — L81.4 OTHER MELANIN HYPERPIGMENTATION: ICD-10-CM

## 2023-10-26 DIAGNOSIS — D22 MELANOCYTIC NEVI: ICD-10-CM

## 2023-10-26 DIAGNOSIS — L82.0 INFLAMED SEBORRHEIC KERATOSIS: ICD-10-CM

## 2023-10-26 DIAGNOSIS — D18.0 HEMANGIOMA: ICD-10-CM

## 2023-10-26 DIAGNOSIS — Z71.89 OTHER SPECIFIED COUNSELING: ICD-10-CM

## 2023-10-26 DIAGNOSIS — L82.1 OTHER SEBORRHEIC KERATOSIS: ICD-10-CM

## 2023-10-26 PROBLEM — D22.71 MELANOCYTIC NEVI OF RIGHT LOWER LIMB, INCLUDING HIP: Status: ACTIVE | Noted: 2023-10-26

## 2023-10-26 PROBLEM — D22.5 MELANOCYTIC NEVI OF TRUNK: Status: ACTIVE | Noted: 2023-10-26

## 2023-10-26 PROBLEM — D18.01 HEMANGIOMA OF SKIN AND SUBCUTANEOUS TISSUE: Status: ACTIVE | Noted: 2023-10-26

## 2023-10-26 PROBLEM — D22.62 MELANOCYTIC NEVI OF LEFT UPPER LIMB, INCLUDING SHOULDER: Status: ACTIVE | Noted: 2023-10-26

## 2023-10-26 PROBLEM — D22.61 MELANOCYTIC NEVI OF RIGHT UPPER LIMB, INCLUDING SHOULDER: Status: ACTIVE | Noted: 2023-10-26

## 2023-10-26 PROBLEM — D22.72 MELANOCYTIC NEVI OF LEFT LOWER LIMB, INCLUDING HIP: Status: ACTIVE | Noted: 2023-10-26

## 2023-10-26 PROCEDURE — ? BENIGN DESTRUCTION

## 2023-10-26 PROCEDURE — 99213 OFFICE O/P EST LOW 20 MIN: CPT | Mod: 25

## 2023-10-26 PROCEDURE — ? COUNSELING

## 2023-10-26 PROCEDURE — 17110 DESTRUCTION B9 LES UP TO 14: CPT

## 2023-10-26 PROCEDURE — ? FULL BODY SKIN EXAM

## 2023-10-26 PROCEDURE — ? SUNSCREEN RECOMMENDATIONS

## 2023-10-26 ASSESSMENT — LOCATION DETAILED DESCRIPTION DERM
LOCATION DETAILED: LEFT MEDIAL MALAR CHEEK
LOCATION DETAILED: RIGHT LATERAL INFERIOR CHEST
LOCATION DETAILED: LEFT ANTERIOR PROXIMAL THIGH
LOCATION DETAILED: RIGHT PROXIMAL PRETIBIAL REGION
LOCATION DETAILED: LEFT MEDIAL THIGH
LOCATION DETAILED: LEFT ANTERIOR DISTAL UPPER ARM
LOCATION DETAILED: LEFT ANTERIOR PROXIMAL UPPER ARM
LOCATION DETAILED: LEFT MID-UPPER BACK
LOCATION DETAILED: STERNUM
LOCATION DETAILED: LEFT INFERIOR UPPER BACK
LOCATION DETAILED: RIGHT SUPERIOR CENTRAL MALAR CHEEK
LOCATION DETAILED: LEFT KNEE
LOCATION DETAILED: LEFT INFERIOR CENTRAL MALAR CHEEK
LOCATION DETAILED: RIGHT ANTERIOR PROXIMAL UPPER ARM
LOCATION DETAILED: RIGHT SUPERIOR MEDIAL UPPER BACK
LOCATION DETAILED: EPIGASTRIC SKIN
LOCATION DETAILED: RIGHT MEDIAL UPPER BACK
LOCATION DETAILED: RIGHT ANTERIOR DISTAL THIGH
LOCATION DETAILED: LEFT INGUINAL FOLD
LOCATION DETAILED: RIGHT MEDIAL MALAR CHEEK
LOCATION DETAILED: LEFT FOREHEAD
LOCATION DETAILED: RIGHT INFERIOR UPPER BACK
LOCATION DETAILED: LEFT DISTAL PRETIBIAL REGION
LOCATION DETAILED: RIGHT ANTERIOR DISTAL UPPER ARM

## 2023-10-26 ASSESSMENT — LOCATION SIMPLE DESCRIPTION DERM
LOCATION SIMPLE: RIGHT THIGH
LOCATION SIMPLE: LEFT FOREHEAD
LOCATION SIMPLE: LEFT THIGH
LOCATION SIMPLE: RIGHT PRETIBIAL REGION
LOCATION SIMPLE: CHEST
LOCATION SIMPLE: LEFT PRETIBIAL REGION
LOCATION SIMPLE: LEFT UPPER BACK
LOCATION SIMPLE: LEFT KNEE
LOCATION SIMPLE: LEFT CHEEK
LOCATION SIMPLE: LEFT LOWER EXTREMITY
LOCATION SIMPLE: LEFT UPPER ARM
LOCATION SIMPLE: ABDOMEN
LOCATION SIMPLE: RIGHT CHEEK
LOCATION SIMPLE: RIGHT UPPER BACK
LOCATION SIMPLE: LEFT INGUINAL FOLD
LOCATION SIMPLE: RIGHT UPPER ARM

## 2023-10-26 ASSESSMENT — LOCATION ZONE DERM
LOCATION ZONE: FACE
LOCATION ZONE: TRUNK
LOCATION ZONE: LEG
LOCATION ZONE: ARM

## 2023-10-26 NOTE — PROCEDURE: BENIGN DESTRUCTION
Post-Care Instructions: I reviewed with the patient in detail post-care instructions. Patient is to wear sunprotection, and avoid picking at any of the treated lesions. Pt may apply Vaseline to crusted or scabbing areas.
Render Post-Care Instructions In Note?: no
Medical Necessity Information: It is in your best interest to select a reason for this procedure from the list below. All of these items fulfill various CMS LCD requirements except the new and changing color options.
Medical Necessity Clause: This procedure was medically necessary because the lesions that were treated were:
Treatment Number (Will Not Render If 0): 1
Consent: The patient's consent was obtained including but not limited to risks of crusting, scabbing, blistering, scarring, darker or lighter pigmentary change, recurrence, incomplete removal and infection.
Detail Level: Detailed

## 2024-02-27 ENCOUNTER — OFFICE VISIT (OUTPATIENT)
Dept: CARDIOLOGY | Facility: CLINIC | Age: 79
End: 2024-02-27
Payer: COMMERCIAL

## 2024-02-27 VITALS
WEIGHT: 218 LBS | HEART RATE: 57 BPM | SYSTOLIC BLOOD PRESSURE: 118 MMHG | DIASTOLIC BLOOD PRESSURE: 70 MMHG | BODY MASS INDEX: 29.53 KG/M2 | HEIGHT: 72 IN | OXYGEN SATURATION: 98 %

## 2024-02-27 DIAGNOSIS — I48.91 ATRIAL FIBRILLATION, UNSPECIFIED TYPE (CMS/HCC): Primary | ICD-10-CM

## 2024-02-27 PROCEDURE — 99214 OFFICE O/P EST MOD 30 MIN: CPT | Performed by: INTERNAL MEDICINE

## 2024-02-27 PROCEDURE — 3008F BODY MASS INDEX DOCD: CPT | Performed by: INTERNAL MEDICINE

## 2024-02-27 PROCEDURE — 93000 ELECTROCARDIOGRAM COMPLETE: CPT | Performed by: INTERNAL MEDICINE

## 2024-02-27 RX ORDER — LINACLOTIDE 145 UG/1
145 CAPSULE, GELATIN COATED ORAL DAILY
COMMUNITY
Start: 2023-12-08

## 2024-02-27 NOTE — PROGRESS NOTES
Electrophysiology Office  Visit       Reason for visit:   Chief Complaint   Patient presents with   • Atrial Fibrillation      HPI   Roger Willig is a 78 y.o. male who is following up for management of paroxysmal atrial fibrillation.  He has had PAF for many years and is managed on flecainide 150 mg twice daily.  He has occasional breakthrough episodes which she has taken an extra dose of flecainide for in the past but has not needed to do this over the last 6 months since his last visit.  He is also on rivaroxaban 20 mg daily for stroke prophylaxis and pravastatin for hyperlipidemia.  He is very active and is recently started a new exercise regimen and some dieting and has lost 12 pounds in the last 6 weeks.  He does have occasional palpitations when he has episodes of atrial fibrillation but has not had any for 6 months.  He continues to get occasional lower extremity edema which is worse on the left than the right and has been happening for many years but this resolves with elevating his legs.  He denies any new cardiac complaints at this time.        Past medical and surgical history, social history, family history and allergies were reviewed and updated in EMR.    Current Outpatient Medications   Medication Sig Dispense Refill   • flecainide (TAMBOCOR) 100 mg tablet Take 1.5 tablets (150 mg total) by mouth 2 (two) times a day. 90 tablet 6   • LINZESS 145 mcg capsule Take 145 mcg by mouth daily.     • multivitamin (THERAGRAN) tablet daily.     • pravastatin (PravachoL) 40 mg tablet Take 1 tablet (40 mg total) by mouth daily. 30 tablet 6   • rivaroxaban (XARELTO) 20 mg tablet Take 1 tablet (20 mg total) by mouth daily. 30 tablet 6     No current facility-administered medications for this visit.        ROS  As per the HPI.  Otherwise comprehensive 10 point review of systems was reviewed and is negative unless mentioned above.      Objective   Vitals:    02/27/24 0906   BP: 118/70   Pulse: (!) 57   SpO2: 98%      Wt Readings from Last 3 Encounters:   02/27/24 98.9 kg (218 lb)   08/21/23 103 kg (227 lb)   02/28/23 98.9 kg (218 lb)     Body mass index is 29.57 kg/m².  Physical Exam  General: No acute distress.  HEENT: Anicteric.  Moist mucous membranes.  Neck: Supple, no JVD.  Lungs: Clear to auscultation bilaterally.  Cardiac: Regular.  No murmurs, rubs or gallops.  Abdomen: Soft, nontender.  Extremities: 1+ left lower extremity edema.  Skin: Warm, dry.  Neurologic: Grossly intact.  Psychiatric: Behavior is appropriate and cooperative.       Lab Results   Component Value Date    HGBA1C 5.6 12/06/2023       Electrocardiogram performed today was personally reviewed by me and showed sinus bradycardia at a rate of 56 bpm with first-degree AV block and otherwise normal intervals, axis, and repolarization.     Cardiac Imaging    ECHOCARDIOGRAM STRESS TEST 01/11/2023    Interpretation Summary  •  Stress Findings: An exercise stress test was performed following the Dung protocol. The patient demonstrated above average exercise capacity. The patient reported shortness of breath during the stress test. Onset of symptoms occurred at stage 2 of the protocol. The patient reached stage 3. The patient achieved the target heart rate. Blood pressure and heart rate demonstrated a physiologic response to exercise. The patient experienced no angina calculating an angina index of 0.  •  Left Ventricle - Resting: Normal ventricle size. Normal wall thickness. Estimated EF 60-65%. No regional wall motion abnormalities. Normal diastolic filling pattern for age.  •  Right Ventricle - Resting: Normal ventricle size. Normal systolic function.  •  Left Atrium - Resting: Normal sized atrium.  •  Right Atrium - Resting: Normal sized atrium.  •  Aortic Valve - Resting: Tricuspid valve.  Sclerotic leaflets. Mild regurgitation. No stenosis.  •  Mitral Valve - Resting: Normal leaflet structure. Mild regurgitation.  •  Tricuspid Valve - Resting: Normal  structure. Trace regurgitation. Estimated RVSP = 26 mmHg.  •  Pulmonic Valve - Resting: Normal structure. Trace regurgitation.  •  Aorta - Resting: Dilatation of the ascending aorta.  4.3 cm unchanged from 2017  •  Response to Stress: ECG was diagnostic. There was no ST segment deviation noted during stress. There were no arrhythmias during stress. There were no arrhythmias during recovery. No RBBB at end of recovery.  Sinus Fredrick with 1st degree AV Block  •  Left Ventricle - Post-Stress: Estimated EF 75%. No regional wall motion abnormalities.    No evidence of ischemia with stress echocardiogram  The patient completed 10 METS of exercise achieved target heart rate  Baseline EKG incomplete right bundle branch block mild QRS widening with exercise no changes diagnostic of ischemia no arrhythmias  Baseline echocardiogram dilated aortic root 4.3 cm other chamber sizes normal.  Unchanged from 2017  No regional wall motion abnormalities preserved ejection fraction 60 to 65%  Trileaflet aortic valve aortic sclerosis, mild aortic insufficiency  Mild mitral regurgitation mild tricuspid regurgitation estimated pulmonary systolic pressure normal at 25 mmHg  All walls became hyperdynamic with exercise         Assessment and Plan:    Mr. Willig is a very pleasant 78-year-old man with a history of paroxysmal atrial fibrillation, hypertension, and hyperlipidemia.  He is doing well on flecainide 150 mg Q12 and rivaroxaban 20 mg daily for management of his atrial fibrillation.  During his last visit I recommended that he stop taking extra doses of flecainide during brief episodes of atrial fibrillation and rather try to wait them out.  He has not had any further episodes since that discussion so we will see how he does over the next 6 months with that plan.  Of note he had labs last month which I reviewed and noted a creatinine of 1.38 with a GFR of 51.  He can remain on 20 mg of Xarelto for now but we will monitor his renal  function and adjust the dose if needed.  He will return to the office in 6 months for routine follow-up.  Thank you for allowing me to participate in the care of your patient, please feel free to contact me with any questions or concerns.     Sierra Gandhi MD  2/27/2024

## 2024-03-28 LAB
ATRIAL RATE: 52
P AXIS: 58
PR INTERVAL: 224
QRS DURATION: 112
QT INTERVAL: 464
QTC CALCULATION(BAZETT): 431
R AXIS: 13
T WAVE AXIS: 59
VENTRICULAR RATE: 52

## 2024-05-21 ENCOUNTER — TELEPHONE (OUTPATIENT)
Dept: SCHEDULING | Facility: CLINIC | Age: 79
End: 2024-05-21
Payer: COMMERCIAL

## 2024-05-21 NOTE — TELEPHONE ENCOUNTER
Pt called said he is having difficulty with his medications. Pt said he feels as though the meds aren't working. Pt would like a call back to discuss further and see if Dr. Gandhi would want to see him sooner than next scheduled appt to discuss     P; [phone]

## 2024-09-24 ENCOUNTER — OFFICE VISIT (OUTPATIENT)
Dept: CARDIOLOGY | Facility: CLINIC | Age: 79
End: 2024-09-24
Payer: COMMERCIAL

## 2024-09-24 VITALS
OXYGEN SATURATION: 98 % | HEART RATE: 69 BPM | WEIGHT: 224 LBS | HEIGHT: 73 IN | BODY MASS INDEX: 29.69 KG/M2 | DIASTOLIC BLOOD PRESSURE: 74 MMHG | SYSTOLIC BLOOD PRESSURE: 122 MMHG

## 2024-09-24 DIAGNOSIS — I48.91 ATRIAL FIBRILLATION, UNSPECIFIED TYPE (CMS/HCC): Primary | ICD-10-CM

## 2024-09-24 DIAGNOSIS — I48.0 PAROXYSMAL ATRIAL FIBRILLATION (CMS/HCC): ICD-10-CM

## 2024-09-24 LAB
ATRIAL RATE: 61
P AXIS: 53
PR INTERVAL: 182
QRS DURATION: 106
QT INTERVAL: 428
QTC CALCULATION(BAZETT): 430
R AXIS: 16
T WAVE AXIS: 55
VENTRICULAR RATE: 61

## 2024-09-24 PROCEDURE — 93000 ELECTROCARDIOGRAM COMPLETE: CPT | Performed by: INTERNAL MEDICINE

## 2024-09-24 PROCEDURE — 3008F BODY MASS INDEX DOCD: CPT | Performed by: INTERNAL MEDICINE

## 2024-09-24 PROCEDURE — 99215 OFFICE O/P EST HI 40 MIN: CPT | Performed by: INTERNAL MEDICINE

## 2024-09-24 RX ORDER — FLUOXETINE HYDROCHLORIDE 20 MG/1
20 CAPSULE ORAL DAILY
COMMUNITY
Start: 2024-09-16

## 2024-09-24 NOTE — PROGRESS NOTES
Electrophysiology Office  Visit       Reason for visit:   Chief Complaint   Patient presents with    Atrial Fibrillation      HPI   Roger Willig is a 79 y.o. male who is following up for management of paroxysmal atrial fibrillation.  He is currently taking flecainide 150 mg twice daily as well as Xarelto for stroke prophylaxis and reports that he has not had any episodes of atrial fibrillation since his last visit in February.  He has had a stressful several months with his wife recently being diagnosed with lymphoma and undergoing chemo infusions, however, despite this, he has not had any palpitations.  In fact, he denies all cardiac symptoms.  He is concerned about his LDL which measured out at 104 at his last check at his primary care's physician's office.  His HDL on that same set of labs was 85.  He continues to take pravastatin.      Past medical and surgical history, social history, family history and allergies were reviewed and updated in EMR.    Current Outpatient Medications   Medication Sig Dispense Refill    flecainide (TAMBOCOR) 100 mg tablet Take 1.5 tablets (150 mg total) by mouth 2 (two) times a day. 90 tablet 6    FLUoxetine (PROzac) 20 mg capsule Take 20 mg by mouth daily.      multivitamin (THERAGRAN) tablet Take 1 tablet by mouth daily.      pravastatin (PravachoL) 40 mg tablet Take 1 tablet (40 mg total) by mouth daily. 30 tablet 6    rivaroxaban (XARELTO) 20 mg tablet Take 1 tablet (20 mg total) by mouth daily. 30 tablet 6    LINZESS 145 mcg capsule Take 145 mcg by mouth daily. (Patient not taking: Reported on 9/24/2024)       No current facility-administered medications for this visit.        ROS  As per the HPI.  Otherwise comprehensive 10 point review of systems was reviewed and is negative unless mentioned above.      Objective   Vitals:    09/24/24 0819   BP: 122/74   Pulse: 69   SpO2: 98%     Wt Readings from Last 3 Encounters:   09/24/24 102 kg (224 lb)   02/27/24 98.9 kg (218 lb)  "  08/21/23 103 kg (227 lb)     Body mass index is 29.55 kg/m².  Physical Exam  General: No acute distress.  HEENT: Anicteric.  Moist mucous membranes.  Neck: Supple, no JVD.  Lungs: Clear to auscultation bilaterally.  Cardiac: Regular.  No murmurs, rubs or gallops.  Abdomen: Soft, nontender.  Extremities: No lower extremity edema.  Skin: Warm, dry.  Neurologic: Grossly intact.  Psychiatric: Behavior is appropriate and cooperative.       Lab Results   Component Value Date    HGBA1C 5.6 12/06/2023     No results found for: \"LDLCALC\", \"HIRISKLDL\", \"LDLDIRECT\", \"TOTLDLRSK\", \"LDLC\", \"REALLDLC\"     Electrocardiogram performed today was personally reviewed by me and showed normal sinus rhythm at 61 bpm.  Normal ECG..     Cardiac Imaging    ECHOCARDIOGRAM STRESS TEST 01/11/2023    Interpretation Summary    Stress Findings: An exercise stress test was performed following the Dung protocol. The patient demonstrated above average exercise capacity. The patient reported shortness of breath during the stress test. Onset of symptoms occurred at stage 2 of the protocol. The patient reached stage 3. The patient achieved the target heart rate. Blood pressure and heart rate demonstrated a physiologic response to exercise. The patient experienced no angina calculating an angina index of 0.    Left Ventricle - Resting: Normal ventricle size. Normal wall thickness. Estimated EF 60-65%. No regional wall motion abnormalities. Normal diastolic filling pattern for age.    Right Ventricle - Resting: Normal ventricle size. Normal systolic function.    Left Atrium - Resting: Normal sized atrium.    Right Atrium - Resting: Normal sized atrium.    Aortic Valve - Resting: Tricuspid valve.  Sclerotic leaflets. Mild regurgitation. No stenosis.    Mitral Valve - Resting: Normal leaflet structure. Mild regurgitation.    Tricuspid Valve - Resting: Normal structure. Trace regurgitation. Estimated RVSP = 26 mmHg.    Pulmonic Valve - Resting: Normal " structure. Trace regurgitation.    Aorta - Resting: Dilatation of the ascending aorta.  4.3 cm unchanged from 2017    Response to Stress: ECG was diagnostic. There was no ST segment deviation noted during stress. There were no arrhythmias during stress. There were no arrhythmias during recovery. No RBBB at end of recovery.  Sinus Fredrick with 1st degree AV Block    Left Ventricle - Post-Stress: Estimated EF 75%. No regional wall motion abnormalities.    No evidence of ischemia with stress echocardiogram  The patient completed 10 METS of exercise achieved target heart rate  Baseline EKG incomplete right bundle branch block mild QRS widening with exercise no changes diagnostic of ischemia no arrhythmias  Baseline echocardiogram dilated aortic root 4.3 cm other chamber sizes normal.  Unchanged from 2017  No regional wall motion abnormalities preserved ejection fraction 60 to 65%  Trileaflet aortic valve aortic sclerosis, mild aortic insufficiency  Mild mitral regurgitation mild tricuspid regurgitation estimated pulmonary systolic pressure normal at 25 mmHg  All walls became hyperdynamic with exercise         Assessment and Plan:    Mr. Willig is a very pleasant 79-year-old man with a history of paroxysmal atrial fibrillation, hypertension, and hyperlipidemia.  He is doing well on flecainide 150 mg every 12 and rivaroxaban 20 mg daily for management of his atrial fibrillation.  His blood pressure is stable today at 122/74.  We had a long discussion about his LDL of 104 as well as his full lipid panel including his HDL of 85 on pravastatin 40 mg daily.  I recommended that he be a little bit more careful with his diet and follow-up with his primary care physician for repeat lipids in 3 to 6 months.  I have also ordered a transthoracic echocardiogram that I have asked him to get after the new year so that we can follow-up on his ascending aortic dilatation which was last measured at 4.3 cm in 2023.  He will follow-up in 1  year.    Thank you for allowing me to participate in the care of your patient, please feel free to contact me with any questions or concerns.     Sierra Gandhi MD  9/24/2024

## 2024-12-30 ENCOUNTER — APPOINTMENT (OUTPATIENT)
Dept: URBAN - METROPOLITAN AREA CLINIC 374 | Facility: CLINIC | Age: 79
Setting detail: DERMATOLOGY
End: 2024-12-30

## 2024-12-30 DIAGNOSIS — L82.1 OTHER SEBORRHEIC KERATOSIS: ICD-10-CM

## 2024-12-30 DIAGNOSIS — Z71.89 OTHER SPECIFIED COUNSELING: ICD-10-CM

## 2024-12-30 DIAGNOSIS — B07.8 OTHER VIRAL WARTS: ICD-10-CM | Status: WORSENING

## 2024-12-30 DIAGNOSIS — D22 MELANOCYTIC NEVI: ICD-10-CM

## 2024-12-30 DIAGNOSIS — B35.6 TINEA CRURIS: ICD-10-CM | Status: INADEQUATELY CONTROLLED

## 2024-12-30 DIAGNOSIS — L81.4 OTHER MELANIN HYPERPIGMENTATION: ICD-10-CM

## 2024-12-30 DIAGNOSIS — D18.0 HEMANGIOMA: ICD-10-CM

## 2024-12-30 DIAGNOSIS — L82.0 INFLAMED SEBORRHEIC KERATOSIS: ICD-10-CM | Status: INADEQUATELY CONTROLLED

## 2024-12-30 PROBLEM — D22.5 MELANOCYTIC NEVI OF TRUNK: Status: ACTIVE | Noted: 2024-12-30

## 2024-12-30 PROBLEM — D18.01 HEMANGIOMA OF SKIN AND SUBCUTANEOUS TISSUE: Status: ACTIVE | Noted: 2024-12-30

## 2024-12-30 PROCEDURE — ? FULL BODY SKIN EXAM

## 2024-12-30 PROCEDURE — ? PRESCRIPTION MEDICATION MANAGEMENT

## 2024-12-30 PROCEDURE — ? SHAVE REMOVAL

## 2024-12-30 PROCEDURE — ? PRESCRIPTION

## 2024-12-30 PROCEDURE — 17110 DESTRUCTION B9 LES UP TO 14: CPT

## 2024-12-30 PROCEDURE — 11300 SHAVE SKIN LESION 0.5 CM/<: CPT | Mod: 59

## 2024-12-30 PROCEDURE — ? COUNSELING

## 2024-12-30 PROCEDURE — ? TREATMENT REGIMEN

## 2024-12-30 PROCEDURE — 99213 OFFICE O/P EST LOW 20 MIN: CPT | Mod: 25

## 2024-12-30 PROCEDURE — ? LIQUID NITROGEN

## 2024-12-30 RX ORDER — BUTENAFINE HYDROCHLORIDE 10 MG/G
CREAM TOPICAL
Qty: 60 | Refills: 1 | Status: ERX | COMMUNITY
Start: 2024-12-30

## 2024-12-30 RX ADMIN — BUTENAFINE HYDROCHLORIDE: 10 CREAM TOPICAL at 00:00

## 2024-12-30 ASSESSMENT — LOCATION DETAILED DESCRIPTION DERM
LOCATION DETAILED: RIGHT ANTERIOR SHOULDER
LOCATION DETAILED: EPIGASTRIC SKIN
LOCATION DETAILED: RIGHT MEDIAL FOREHEAD
LOCATION DETAILED: INFERIOR THORACIC SPINE
LOCATION DETAILED: LEFT INGUINAL CREASE
LOCATION DETAILED: UPPER STERNUM
LOCATION DETAILED: LEFT CENTRAL MALAR CHEEK
LOCATION DETAILED: LEFT PROXIMAL PRETIBIAL REGION
LOCATION DETAILED: RIGHT INFERIOR MEDIAL UPPER BACK
LOCATION DETAILED: RIGHT SUPERIOR PARIETAL SCALP

## 2024-12-30 ASSESSMENT — LOCATION SIMPLE DESCRIPTION DERM
LOCATION SIMPLE: LEFT PRETIBIAL REGION
LOCATION SIMPLE: LEFT CHEEK
LOCATION SIMPLE: CHEST
LOCATION SIMPLE: RIGHT FOREHEAD
LOCATION SIMPLE: SCALP
LOCATION SIMPLE: ABDOMEN
LOCATION SIMPLE: LEFT INGUINAL CREASE
LOCATION SIMPLE: RIGHT UPPER BACK
LOCATION SIMPLE: UPPER BACK
LOCATION SIMPLE: RIGHT SHOULDER

## 2024-12-30 ASSESSMENT — LOCATION ZONE DERM
LOCATION ZONE: ARM
LOCATION ZONE: LEG
LOCATION ZONE: TRUNK
LOCATION ZONE: FACE
LOCATION ZONE: SCALP

## 2024-12-30 NOTE — PROCEDURE: PRESCRIPTION MEDICATION MANAGEMENT
Plan: f/u 1-2 months if not resolved
Initiate Treatment: butenafine 1 % topical cream TP Sig: Apply to AA of groin once daily x 2 weeks or resolved
Detail Level: Zone
Render In Strict Bullet Format?: No

## 2024-12-30 NOTE — PROCEDURE: LIQUID NITROGEN
Show Applicator Variable?: Yes
Render Note In Bullet Format When Appropriate: No
Consent: The patient's consent was obtained including but not limited to risks of crusting, scabbing, blistering, scarring, darker or lighter pigmentary change, recurrence, incomplete removal and infection.
Medical Necessity Clause: This procedure was medically necessary because the lesions that were treated were:
Medical Necessity Information: It is in your best interest to select a reason for this procedure from the list below. All of these items fulfill various CMS LCD requirements except the new and changing color options.
Spray Paint Text: The liquid nitrogen was applied to the skin utilizing a spray paint frosting technique.
Number Of Freeze-Thaw Cycles: 3 freeze-thaw cycles
Detail Level: Detailed
Post-Care Instructions: I reviewed with the patient in detail post-care instructions. Patient is to wear sunprotection, and avoid picking at any of the treated lesions. Pt may apply Vaseline to crusted or scabbing areas.
Duration Of Freeze Thaw-Cycle (Seconds): 3

## 2024-12-30 NOTE — PROCEDURE: SHAVE REMOVAL
Medical Necessity Information: It is in your best interest to select a reason for this procedure from the list below. All of these items fulfill various CMS LCD requirements except the new and changing color options.
Medical Necessity Clause: This procedure was medically necessary because the lesion that was treated was:
Lab: 6
Lab Facility: 3
Detail Level: Detailed
Was A Bandage Applied: Yes
Size Of Lesion In Cm (Required): 0.4
X Size Of Lesion In Cm (Optional): 0
Depth Of Shave: dermis
Biopsy Method: Dermablade
Anesthesia Type: 1% lidocaine with epinephrine
Anesthesia Volume In Cc: 0.2
Hemostasis: Aluminum Chloride
Wound Care: Petrolatum
Render Path Notes In Note?: No
Consent was obtained from the patient. The risks and benefits to therapy were discussed in detail. Specifically, the risks of infection, scarring, bleeding, prolonged wound healing, incomplete removal, allergy to anesthesia, nerve injury and recurrence were addressed. Prior to the procedure, the treatment site was clearly identified and confirmed by the patient. All components of Universal Protocol/PAUSE Rule completed.
Post-Care Instructions: I reviewed with the patient in detail post-care instructions. Patient is to keep the biopsy site dry overnight, and then apply bacitracin twice daily until healed. Patient may apply hydrogen peroxide soaks to remove any crusting.
Notification Instructions: Patient will be notified of pathology results. However, patient instructed to call the office if not contacted within 2 weeks.
Billing Type: Third-Party Bill

## 2025-02-03 ENCOUNTER — HOSPITAL ENCOUNTER (OUTPATIENT)
Dept: CARDIOLOGY | Facility: CLINIC | Age: 80
Discharge: HOME | End: 2025-02-03
Attending: INTERNAL MEDICINE
Payer: COMMERCIAL

## 2025-02-03 VITALS
SYSTOLIC BLOOD PRESSURE: 115 MMHG | DIASTOLIC BLOOD PRESSURE: 74 MMHG | HEIGHT: 73 IN | BODY MASS INDEX: 29.69 KG/M2 | WEIGHT: 224 LBS

## 2025-02-03 DIAGNOSIS — I48.0 PAROXYSMAL ATRIAL FIBRILLATION (CMS/HCC): ICD-10-CM

## 2025-02-03 LAB
AORTIC ROOT ANNULUS: 3.8 CM
AORTIC VALVE MEAN VELOCITY: 1.09 M/S
AORTIC VALVE VELOCITY TIME INTEGRAL: 35.6 CM
ASCENDING AORTA: 4.3 CM
AV MEAN GRADIENT: 5 MMHG
AV PEAK GRADIENT: 11 MMHG
AV PEAK VELOCITY-S: 1.67 M/S
AV REG PEAK VEL: 3.77 M/S
AV REGURGITATION PRESSURE HALF TIME: 920 MS
AV VALVE AREA INDEX: 1.08
AV VALVE AREA: 2.07 CM2
AV VELOCITY RATIO: 0.71
AVA (VTI): 2.47 CM2
BSA FOR ECHO PROCEDURE: 2.29 M2
CUSP SEPARATION: 1.8 CM
DOP CALC LVOT STROKE VOLUME: 87.93 CM3
E WAVE DECELERATION TIME: 359 MS
E/A RATIO: 0.9
E/E' RATIO: 9.8
E/LAT E' RATIO: 10
EDV (BP): 108 CM3
EF (A4C): 66.5 %
EF A2C: 66.4 %
EJECTION FRACTION: 66.3 %
EST RIGHT VENT SYSTOLIC PRESSURE BY TRICUSPID REGURGITATION JET: 21 MMHG
ESV (BP): 36.4 CM3
FRACTIONAL SHORTENING: 42.78 %
INTERVENTRICULAR SEPTUM: 1.03 CM
LA ESV (BP): 38.3 CM3
LA ESV INDEX (A2C): 21.66 CM3/M2
LA ESV INDEX (BP): 16.72 CM3/M2
LA/AORTA RATIO: 0.95
LAAS-AP2: 18.4 CM2
LAAS-AP4: 13.2 CM2
LAD 2D: 3.6 CM
LALD A4C: 5.08 CM
LALD A4C: 5.3 CM
LAV-S: 49.6 CM3
LEFT ATRIUM VOLUME INDEX: 12.62 CM3/M2
LEFT ATRIUM VOLUME: 28.9 CM3
LEFT INTERNAL DIMENSION IN SYSTOLE: 3.09 CM (ref 4.08–6.18)
LEFT VENTRICLE DIASTOLIC VOLUME INDEX: 58.08 CM3/M2
LEFT VENTRICLE DIASTOLIC VOLUME: 133 CM3
LEFT VENTRICLE SYSTOLIC VOLUME INDEX: 19.43 CM3/M2
LEFT VENTRICLE SYSTOLIC VOLUME: 44.5 CM3
LEFT VENTRICULAR INTERNAL DIMENSION IN DIASTOLE: 5.4 CM (ref 7.03–9.77)
LEFT VENTRICULAR POSTERIOR WALL IN END DIASTOLE: 0.87 CM (ref 0.84–1.57)
LV DIASTOLIC VOLUME: 76.4 CM3
LV ESV (APICAL 2 CHAMBER): 25.7 CM3
LVAD-AP2: 26.8 CM2
LVAD-AP4: 38 CM2
LVAS-AP2: 13.3 CM2
LVAS-AP4: 18.9 CM2
LVEDVI(A2C): 33.36 CM3/M2
LVEDVI(BP): 47.16 CM3/M2
LVESVI(A2C): 11.22 CM3/M2
LVESVI(BP): 15.9 CM3/M2
LVLD-AP2: 7.93 CM
LVLD-AP4: 9.16 CM
LVLS-AP2: 6.01 CM
LVLS-AP4: 7.01 CM
LVOT 2D: 2.1 CM
LVOT A: 3.46 CM2
LVOT MG: 3 MMHG
LVOT MV: 0.77 M/S
LVOT PEAK VELOCITY: 1.27 M/S
LVOT PG: 6 MMHG
LVOT STROKE VOLUME INDEX: 38.4 ML/M2
LVOT VTI: 25.4 CM
MLH CV ECHO AVA INDEX VELOCITY RATIO: 0.9
MV E'TISSUE VEL-LAT: 0.08 M/S
MV E'TISSUE VEL-MED: 0.08 M/S
MV PEAK A VEL: 0.83 M/S
MV PEAK E VEL: 0.75 M/S
MV STENOSIS PRESSURE HALF TIME: 105 MS
MV VALVE AREA P 1/2 METHOD: 2.1 CM2
PISA AR MAX VEL: 3.64 M/S
PISA AR MAX VEL: 3.89 M/S
POSTERIOR WALL: 0.87 CM
RAP: 3 MMHG
RVOT VMAX: 0.62 M/S
SEPTAL TISSUE DOPPLER FREE WALL LATE DIA VELOCITY (APICAL 4 CHAMBER VIEW): 0.15 M/S
TR MAX PG: 17.64 MMHG
TRICUSPID VALVE PEAK REGURGITATION VELOCITY: 2.1 M/S
Z-SCORE OF LEFT VENTRICULAR DIMENSION IN END DIASTOLE: -4.29
Z-SCORE OF LEFT VENTRICULAR DIMENSION IN END SYSTOLE: -3.83
Z-SCORE OF LEFT VENTRICULAR POSTERIOR WALL IN END DIASTOLE: -1.44

## 2025-02-03 PROCEDURE — 93306 TTE W/DOPPLER COMPLETE: CPT | Performed by: STUDENT IN AN ORGANIZED HEALTH CARE EDUCATION/TRAINING PROGRAM

## 2025-08-21 ENCOUNTER — APPOINTMENT (OUTPATIENT)
Dept: URBAN - METROPOLITAN AREA CLINIC 374 | Facility: CLINIC | Age: 80
Setting detail: DERMATOLOGY
End: 2025-08-21

## 2025-08-21 DIAGNOSIS — D22 MELANOCYTIC NEVI: ICD-10-CM | Status: WORSENING

## 2025-08-21 DIAGNOSIS — B07.8 OTHER VIRAL WARTS: ICD-10-CM | Status: INADEQUATELY CONTROLLED

## 2025-08-21 DIAGNOSIS — D17 BENIGN LIPOMATOUS NEOPLASM: ICD-10-CM | Status: UNCHANGED

## 2025-08-21 PROBLEM — D17.1 BENIGN LIPOMATOUS NEOPLASM OF SKIN AND SUBCUTANEOUS TISSUE OF TRUNK: Status: ACTIVE | Noted: 2025-08-21

## 2025-08-21 PROBLEM — D22.5 MELANOCYTIC NEVI OF TRUNK: Status: ACTIVE | Noted: 2025-08-21

## 2025-08-21 PROCEDURE — ? DEFER

## 2025-08-21 PROCEDURE — ? MEDICARE ABN

## 2025-08-21 PROCEDURE — ? LIQUID NITROGEN

## 2025-08-21 PROCEDURE — ? COUNSELING

## 2025-08-21 ASSESSMENT — LOCATION ZONE DERM
LOCATION ZONE: ARM
LOCATION ZONE: TRUNK
LOCATION ZONE: SCALP

## 2025-08-21 ASSESSMENT — LOCATION SIMPLE DESCRIPTION DERM
LOCATION SIMPLE: RIGHT UPPER BACK
LOCATION SIMPLE: LEFT FOREARM
LOCATION SIMPLE: RIGHT BUTTOCK
LOCATION SIMPLE: SCALP
LOCATION SIMPLE: LEFT ELBOW

## 2025-08-21 ASSESSMENT — LOCATION DETAILED DESCRIPTION DERM
LOCATION DETAILED: LEFT VENTRAL PROXIMAL FOREARM
LOCATION DETAILED: LEFT ANTECUBITAL SKIN
LOCATION DETAILED: RIGHT INFERIOR UPPER BACK
LOCATION DETAILED: LEFT VENTRAL LATERAL PROXIMAL FOREARM
LOCATION DETAILED: RIGHT BUTTOCK
LOCATION DETAILED: LEFT CENTRAL FRONTAL SCALP
LOCATION DETAILED: LEFT LATERAL ANTECUBITAL SKIN